# Patient Record
Sex: MALE | Race: WHITE
[De-identification: names, ages, dates, MRNs, and addresses within clinical notes are randomized per-mention and may not be internally consistent; named-entity substitution may affect disease eponyms.]

---

## 2017-07-02 ENCOUNTER — HOSPITAL ENCOUNTER (EMERGENCY)
Dept: HOSPITAL 25 - ED | Age: 57
Discharge: HOME | End: 2017-07-02
Payer: COMMERCIAL

## 2017-07-02 ENCOUNTER — HOSPITAL ENCOUNTER (EMERGENCY)
Dept: HOSPITAL 25 - UCEAST | Age: 57
Discharge: ANOTHER HOSPITAL | End: 2017-07-02
Payer: COMMERCIAL

## 2017-07-02 VITALS — SYSTOLIC BLOOD PRESSURE: 148 MMHG | DIASTOLIC BLOOD PRESSURE: 96 MMHG

## 2017-07-02 VITALS — DIASTOLIC BLOOD PRESSURE: 102 MMHG | SYSTOLIC BLOOD PRESSURE: 174 MMHG

## 2017-07-02 DIAGNOSIS — N20.0: Primary | ICD-10-CM

## 2017-07-02 DIAGNOSIS — I25.119: ICD-10-CM

## 2017-07-02 DIAGNOSIS — I10: ICD-10-CM

## 2017-07-02 DIAGNOSIS — R10.9: Primary | ICD-10-CM

## 2017-07-02 DIAGNOSIS — Z87.891: ICD-10-CM

## 2017-07-02 DIAGNOSIS — J45.909: ICD-10-CM

## 2017-07-02 LAB
ALBUMIN SERPL BCG-MCNC: 4.4 G/DL (ref 3.2–5.2)
ALP SERPL-CCNC: 41 U/L (ref 34–104)
ALT SERPL W P-5'-P-CCNC: 34 U/L (ref 7–52)
ANION GAP SERPL CALC-SCNC: 11 MMOL/L (ref 2–11)
AST SERPL-CCNC: 32 U/L (ref 13–39)
BUN SERPL-MCNC: 27 MG/DL (ref 6–24)
BUN/CREAT SERPL: 19.7 (ref 8–20)
CALCIUM SERPL-MCNC: 9.5 MG/DL (ref 8.6–10.3)
CHLORIDE SERPL-SCNC: 104 MMOL/L (ref 101–111)
GLOBULIN SER CALC-MCNC: 2.7 G/DL (ref 2–4)
GLUCOSE SERPL-MCNC: 172 MG/DL (ref 70–100)
HCO3 SERPL-SCNC: 25 MMOL/L (ref 22–32)
HCT VFR BLD AUTO: 45 % (ref 42–52)
HGB BLD-MCNC: 14.6 G/DL (ref 14–18)
LIPASE SERPL-CCNC: 24 U/L (ref 11–82)
MCH RBC QN AUTO: 34 PG (ref 27–31)
MCHC RBC AUTO-ENTMCNC: 33 G/DL (ref 31–36)
MCV RBC AUTO: 102 FL (ref 80–94)
POTASSIUM SERPL-SCNC: 3.9 MMOL/L (ref 3.5–5)
PROT SERPL-MCNC: 7.1 G/DL (ref 6.4–8.9)
RBC # BLD AUTO: 4.36 10^6/UL (ref 4–5.4)
SODIUM SERPL-SCNC: 140 MMOL/L (ref 133–145)
WBC # BLD AUTO: 9.1 10^3/UL (ref 3.5–10.8)

## 2017-07-02 PROCEDURE — 81003 URINALYSIS AUTO W/O SCOPE: CPT

## 2017-07-02 PROCEDURE — 74176 CT ABD & PELVIS W/O CONTRAST: CPT

## 2017-07-02 PROCEDURE — 96375 TX/PRO/DX INJ NEW DRUG ADDON: CPT

## 2017-07-02 PROCEDURE — 81015 MICROSCOPIC EXAM OF URINE: CPT

## 2017-07-02 PROCEDURE — 99214 OFFICE O/P EST MOD 30 MIN: CPT

## 2017-07-02 PROCEDURE — 83690 ASSAY OF LIPASE: CPT

## 2017-07-02 PROCEDURE — 96374 THER/PROPH/DIAG INJ IV PUSH: CPT

## 2017-07-02 PROCEDURE — 93005 ELECTROCARDIOGRAM TRACING: CPT

## 2017-07-02 PROCEDURE — 86141 C-REACTIVE PROTEIN HS: CPT

## 2017-07-02 PROCEDURE — G0463 HOSPITAL OUTPT CLINIC VISIT: HCPCS

## 2017-07-02 PROCEDURE — 99283 EMERGENCY DEPT VISIT LOW MDM: CPT

## 2017-07-02 PROCEDURE — 36415 COLL VENOUS BLD VENIPUNCTURE: CPT

## 2017-07-02 PROCEDURE — 80053 COMPREHEN METABOLIC PANEL: CPT

## 2017-07-02 PROCEDURE — 96360 HYDRATION IV INFUSION INIT: CPT

## 2017-07-02 PROCEDURE — 85025 COMPLETE CBC W/AUTO DIFF WBC: CPT

## 2017-07-02 RX ADMIN — SODIUM CHLORIDE ONE MLS/HR: 900 IRRIGANT IRRIGATION at 10:47

## 2017-07-02 RX ADMIN — SODIUM CHLORIDE ONE MLS/HR: 900 IRRIGANT IRRIGATION at 11:27

## 2017-07-02 NOTE — RAD
INDICATION: Left flank pain     



COMPARISON: CT August 31, 2015

 

TECHNIQUE: Noncontrast axial source images were acquired from the level hemidiaphragms to

the symphysis pubis as part of CT imaging for renal stone.



Lung bases: The lung bases are clear.



Liver: The liver is enlarged with findings of hepatic steatosis. Noncontrast imaging shows

no evidence of a hepatic mass or ductal dilatation.



Gallbladder: There are no calcified gallstones. There is no evidence of wall thickening or

pericholecystic fluid..



Spleen: The spleen is normal in size. The noncontrast CT appearance is normal. There is a

small splenule



Pancreas: Noncontrast imaging shows no pancreatic mass or ductal dilitation.



Adrenal glands: No masses are identified.



Kidneys/Bladder: There is a proximal, 4 mm, left ureteral calculus producing mild to

moderate obstructive findings. There are no other microcalcifications of urinary

significance. There is mild left-sided perinephric stranding.



Adenopathy: There is no evidence of intraperitoneal or retroperitoneal adenopathy.

Evaluation is limited without oral contrast.



Fluid collections: There are no free or localized fluid collections.



Vessels: The aorta and iliac vessels are normal in caliber. There are no significant

atherosclerotic changes. The IVC appears normal 



Pelvic organs: The prostate and seminal vesicles appear normal



GI tract: Evaluation of the bowel is limited without oral contrast. The stomach and upper

GI tract are unremarkable. The lower GI tract is remarkable for scattered diverticula of

the sigmoid colon. There are no obstructive findings. The appendix is visualized and

appears normal.



Soft tissues: No soft tissue abnormalities of the extraperitoneal abdomen or pelvis are

identified.



Osseous structures: There are no acute osseous findings. There is chronic L5 spondylolysis

with a minimal anterolisthesis. There is degenerative disc disease at L5-S1. There is

spurring about the thoracolumbar junction.



IMPRESSION:  4 MM PROXIMAL LEFT URETERAL CALCULUS WITH MILD/MODERATE OBSTRUCTIVE FINDINGS.

## 2017-07-02 NOTE — UC
Abdominal Pain Male HPI





- HPI Summary


HPI Summary: 





56 YEAR OLD MALE PRESENTS WITH SEVERE LEFT FLANK PAIN. 





- History of Current Complaint


Chief Complaint: UCGU


Stated Complaint: ABDOMINAL PAIN


Time Seen by Provider: 07/02/17 09:24


Severity Initially: Severe


Severity Currently: Severe


Pain Scale Used: 0-10 Numeric - 9/10





- Allergies/Home Medications


Allergies/Adverse Reactions: 


 Allergies











Allergy/AdvReac Type Severity Reaction Status Date / Time


 


Amlodipine [From Norvasc] AdvReac Unknown Edema Verified 07/02/17 11:12














PMH/Surg Hx/FS Hx/Imm Hx





- Surgical History


Surgical History: Yes


Surgery Procedure, Year, and Place: RIGHT KNEE ARTHROSCOPES X 3; bowel surgery 

endometrial reattachment; T&A; CARDIAC STENTS X 2- PATIENT WILL BRING CARDS TO 

BE SCANNED IN (PER CARDIAC CATH REPORT A PROMUS PREMIER STENT AND A PROMUS 

ELEMENT STENT WERE PLACED)





- Social History


Alcohol Use: Weekly


Alcohol Amount: 2-3 DRINKS/WEEK


Substance Use Type: None


Smoking Status (MU): Former Smoker


Type: Cigarettes


Length of Time of Smoking/Using Tobacco: 15 years


Have You Smoked in the Last Year: No


When Did the Patient Quit Smoking/Using Tobacco: 25 YRS AGO





- Immunization History


Most Recent Influenza Vaccination: 11/01/14


Most Recent Tetanus Shot: thirty years ago


Most Recent Pneumonia Vaccination: no


Vaccination Up to Date: Yes





Review of Systems


Constitutional: Negative


Skin: Negative


Eyes: Negative


ENT: Negative


Respiratory: Negative


Cardiovascular: Negative


Gastrointestinal: Abdominal Pain


Genitourinary: Negative


Motor: Negative


Neurovascular: Negative


Musculoskeletal: Negative


Neurological: Negative


Psychological: Negative


All Other Systems Reviewed And Are Negative: Yes





Physical Exam


Triage Information Reviewed: Yes


Vital Signs: 


 Initial Vital Signs











Temp  35.7 C   07/02/17 09:19


 


Pulse  62   07/02/17 09:19


 


Resp  20   07/02/17 09:19


 


BP  156/88   07/02/17 09:19


 


Pulse Ox  99   07/02/17 09:19











Eye Exam: Normal


ENT Exam: Normal


Dental Exam: Normal


Neck exam: Normal


Neck: Positive: 1


Respiratory Exam: Normal


Cardiovascular Exam: Normal


Abdominal Exam: Normal


Abdomen Description: Positive: CVA Tenderness (L), Guarding


Musculoskeletal Exam: Normal


Neurological Exam: Normal


Psychological Exam: Normal


Skin Exam: Normal





Abd Pain Male Course/Dx





- Differential Dx/Clinical Impression


Provider Diagnoses: LEFT FLANK PAIN





Discharge





- Discharge Plan


Condition: Guarded


Disposition: TRANS HIGHER LVL OF CARE FAC


Patient Education Materials:  Kidney Stones (ED)


Referrals: 


Jimbo Rasheed MD [Primary Care Provider] -

## 2017-07-02 NOTE — ED
GI/ HPI





- HPI Summary


HPI Summary: 


56M w/ PMH of MI presents with left side flank pain for a day.   He admits to 

intense pain.  He has a history of kidney stones. He denies any fever, dysuria, 

hematuria, frequency, urgency.  He denies any diarrhea, constipation.  He has a 

previous bowel obstruction that needed surgery pain years ago.  The pain is 

similar to when has kidney stones in past.  He admits to vomiting and nausea. 








- History of Current Complaint


Chief Complaint: EDFlankPain


Time Seen by Provider: 07/02/17 10:39


Stated Complaint: ABD PAIN





- Allergy/Home Medications


Allergies/Adverse Reactions: 


 Allergies











Allergy/AdvReac Type Severity Reaction Status Date / Time


 


Amlodipine [From Norvasc] AdvReac Unknown Edema Verified 07/02/17 11:12














PMH/Surg Hx/FS Hx/Imm Hx


Endocrine/Hematology History: 


   Denies: Hx Diabetes


Cardiovascular History: Reports: Hx Angina, Hx Coronary Artery Disease, Hx 

Hypercholesterolemia, Hx Hypertension


   Denies: Hx Congestive Heart Failure, Hx Pacemaker/ICD


Respiratory History: Reports: Hx Asthma - poss recent diagnosis, Hx Seasonal 

Allergies - "constant", Hx Sleep Apnea, Other Respiratory Problems/Disorders - 

ex-smoker


GI History: Reports: Hx Obstructive Bowel


   Denies: Hx Ulcer


 History: Reports: Hx Kidney Stones


   Denies: Hx Acute Renal Failure, Hx Renal Disease


Musculoskeletal History: Reports: Hx Arthritis - knees, back, hands, Hx 

Rheumatoid Arthritis, Hx Back Problems - fractures, degenerated disks, bone 

spurs, Hx Tendonitis - elbow


   Denies: Hx Scoliosis


Sensory History: Reports: Hx Contacts or Glasses


   Denies: Hx Cataracts


Opthamlomology History: Reports: Hx Contacts or Glasses


   Denies: Hx Cataracts


Neurological History: Reports: Other Neuro Impairments/Disorders - PAIN CLINIC 

PT


   Denies: Hx Developmental Delay, Hx Headaches, Hx Migraine, Hx Nerve Disease, 

Hx Seizures, Hx Spinal Cord Injury, Hx Transient Ischemic Attacks (TIA)


Psychiatric History: 


   Denies: Hx Panic Disorder





- Surgical History


Surgery Procedure, Year, and Place: RIGHT KNEE ARTHROSCOPES X 3; bowel surgery 

endometrial reattachment; T&A; CARDIAC STENTS X 2- PATIENT WILL BRING CARDS TO 

BE SCANNED IN (PER CARDIAC CATH REPORT A PROMUS PREMIER STENT AND A PROMUS 

ELEMENT STENT WERE PLACED)


Hx Anesthesia Reactions: No


Infectious Disease History: No


Infectious Disease History: 


   Denies: Hx Clostridium Difficile, Hx Hepatitis, Hx Human Immunodeficiency 

Virus (HIV), Hx of Known/Suspected MRSA, Hx Shingles, Hx Tuberculosis, Hx Known/

Suspected VRE, Hx Known/Suspected VRSA, History Other Infectious Disease, 

Traveled Outside the [place] in Last 30 Days





- Family History


Known Family History: Positive: Cardiac Disease





- Social History


Alcohol Use: Weekly


Alcohol Amount: 2-3 DRINKS/WEEK


Substance Use Type: Reports: None


Smoking Status (MU): Former Smoker


Type: Cigarettes


Length of Time of Smoking/Using Tobacco: 15 years


Have You Smoked in the Last Year: No





Review of Systems


Negative: Fever


Negative: Chest Pain


Negative: Shortness Of Breath


Positive: Vomiting, Nausea.  Negative: Abdominal Pain, Diarrhea


Positive: flank pain.  Negative: dysuria, frequency


All Other Systems Reviewed And Are Negative: Yes





Physical Exam


Triage Information Reviewed: Yes


Vital Signs On Initial Exam: 


 Initial Vitals











Temp Pulse Resp BP Pulse Ox


 


 96.7 F   68   12   153/106   99 


 


 07/02/17 10:15  07/02/17 10:15  07/02/17 10:15  07/02/17 10:15  07/02/17 10:15











Vital Signs Reviewed: Yes


Appearance: Positive: Pain Distress


Skin: Positive: Warm, Dry


Head/Face: Positive: Normal Head/Face Inspection


Eyes: Positive: Normal, Conjunctiva Clear


ENT: Positive: Normal ENT inspection, Pharynx normal, TMs normal


Respiratory/Lung Sounds: Positive: Clear to Auscultation, Breath Sounds Present


Cardiovascular: Positive: Normal, RRR


Abdomen Description: Positive: Soft, CVA Tenderness (L), Other: - tenderness on 

left side of abdomen


Bowel Sounds: Positive: Present





- [place] Coma Scale


Coma Scale Total: 15





Diagnostics





- Vital Signs


 Vital Signs











  Temp Pulse Resp BP Pulse Ox


 


 07/02/17 11:02      95


 


 07/02/17 11:01      93


 


 07/02/17 11:00   71  17  147/119  98


 


 07/02/17 10:50      81


 


 07/02/17 10:47    20  


 


 07/02/17 10:33   71  22  143/92  99


 


 07/02/17 10:20   65   153/106  100


 


 07/02/17 10:15  96.7 F  68  12  153/106  99














- Laboratory


Lab Results: 


 Lab Results











  07/02/17 07/02/17 Range/Units





  11:05 11:05 


 


WBC  9.1   (3.5-10.8)  10^3/ul


 


RBC  4.36   (4.0-5.4)  10^6/ul


 


Hgb  14.6   (14.0-18.0)  g/dl


 


Hct  45   (42-52)  %


 


MCV  102 H   (80-94)  fL


 


MCH  34 H   (27-31)  pg


 


MCHC  33   (31-36)  g/dl


 


RDW  14   (10.5-15)  %


 


Plt Count  189   (150-450)  10^3/ul


 


MPV  8   (7.4-10.4)  um3


 


Neut % (Auto)  87.1 H   (38-83)  %


 


Lymph % (Auto)  8.7 L   (25-47)  %


 


Mono % (Auto)  3.7   (1-9)  %


 


Eos % (Auto)  0.1   (0-6)  %


 


Baso % (Auto)  0.4   (0-2)  %


 


Absolute Neuts (auto)  7.9 H   (1.5-7.7)  10^3/ul


 


Absolute Lymphs (auto)  0.8 L   (1.0-4.8)  10^3/ul


 


Absolute Monos (auto)  0.3   (0-0.8)  10^3/ul


 


Absolute Eos (auto)  0   (0-0.6)  10^3/ul


 


Absolute Basos (auto)  0   (0-0.2)  10^3/ul


 


Absolute Nucleated RBC  0.01   10^3/ul


 


Nucleated RBC %  0.1   


 


Sodium   140  (133-145)  mmol/L


 


Potassium   3.9  (3.5-5.0)  mmol/L


 


Chloride   104  (101-111)  mmol/L


 


Carbon Dioxide   25  (22-32)  mmol/L


 


Anion Gap   11  (2-11)  mmol/L


 


BUN   27 H  (6-24)  mg/dL


 


Creatinine   1.37 H  (0.67-1.17)  mg/dL


 


Est GFR ( Amer)   68.9  (>60)  


 


Est GFR (Non-Af Amer)   53.6  (>60)  


 


BUN/Creatinine Ratio   19.7  (8-20)  


 


Glucose   172 H  ()  mg/dL


 


Calcium   9.5  (8.6-10.3)  mg/dL


 


Total Bilirubin   0.70  (0.2-1.0)  mg/dL


 


AST   32  (13-39)  U/L


 


ALT   34  (7-52)  U/L


 


Alkaline Phosphatase   41  ()  U/L


 


C-React Prot High Sens   1.65  mg/L


 


Total Protein   7.1  (6.4-8.9)  g/dL


 


Albumin   4.4  (3.2-5.2)  g/dL


 


Globulin   2.7  (2-4)  g/dL


 


Albumin/Globulin Ratio   1.6  (1-3)  


 


Lipase   24  (11.0-82.0)  U/L











Result Diagrams: 


 07/02/17 11:05





 07/02/17 11:05


Lab Statement: Any lab studies that have been ordered have been reviewed, and 

results considered in the medical decision making process.





- CT


  ** abd


CT Interpretation: Positive (See Comments) - IMPRESSION: 4 MM PROXIMAL LEFT 

URETERAL CALCULUS WITH MILD/MODERATE OBSTRUCTIVE FINDINGS.


CT Interpretation Completed By: Radiologist





Re-Evaluation





- Re-Evaluation


  ** First Eval


Change: Improved


Comment: pain improved with dilaudid and morphine. dilaudid did call O2 stats 

to dec





GIGU Course/Dx





- Course


Course Of Treatment: 56M w/ PMH of MI presents with left side flank pain for a 

day.   He admits to intense pain.  He has a history of kidney stones. He denies 

any fever, dysuria, hematuria, frequency, urgency.  He denies any diarrhea, 

constipation.  He has a previous bowel obstruction that needed surgery pain 

years ago.  The pain is similar to when has kidney stones in past.  He admits 

to vomiting and nausea.  CVA tenderness on left. CT shows 4mm stone. discussed 

dr rodas says send home with pain meds and call office tomorrow. pain 

controlled. u/a normal. patient understands and agrees with plan





- Diagnoses


Differential Diagnoses - Male: Pyelonephritis, Ureteral Calculi, Urinary Tract 

Infection


Provider Diagnoses: 


 Kidney stone on left side








Discharge





- Discharge Plan


Condition: Good


Disposition: HOME


Prescriptions: 


Ondansetron ODT TAB* [Zofran 4 MG Odt TAB*] 4 mg PO Q6H PRN #20 tab.odt


 PRN Reason: Nausea


Tamsulosin CAP* [Flomax CAP*] 0.4 mg PO DAILY #10 cap


oxyCODONE/Acetamin 5/325 MG* [Percocet 5/325 TAB*] 1 tab PO Q4H PRN #24 tab MDD 

6


 PRN Reason: Pain


Patient Education Materials:  Kidney Stones (ED)


Referrals: 


Juan Rodas MD [Medical Doctor] - 


Jimbo Rasheed MD [Primary Care Provider] - 


Additional Instructions: 


Take ibuprofen three times a day and narcotic as needed every 4-6 hours up two 

tablets


Narcotic will make constipated


Take Zofran every 6 hours for nausea


Take Flomax daily starting tomorrow, first dose given in ED until stone expelled

, make sure stand up slowly


Strain urine for stone


Call office tomorrow for urology follow up


Return to ED if unable to manage pain at home, develop fever, severe vomiting 

or any new or worsening symptoms

## 2017-07-04 ENCOUNTER — HOSPITAL ENCOUNTER (EMERGENCY)
Dept: HOSPITAL 25 - ED | Age: 57
LOS: 1 days | Discharge: ANOTHER HOSPITAL | End: 2017-07-05
Payer: COMMERCIAL

## 2017-07-04 DIAGNOSIS — N23: Primary | ICD-10-CM

## 2017-07-04 DIAGNOSIS — I25.2: ICD-10-CM

## 2017-07-04 DIAGNOSIS — Z87.891: ICD-10-CM

## 2017-07-04 DIAGNOSIS — I10: ICD-10-CM

## 2017-07-04 LAB
ALBUMIN SERPL BCG-MCNC: 4.1 G/DL (ref 3.2–5.2)
ALP SERPL-CCNC: 44 U/L (ref 34–104)
ALT SERPL W P-5'-P-CCNC: 23 U/L (ref 7–52)
ANION GAP SERPL CALC-SCNC: 8 MMOL/L (ref 2–11)
AST SERPL-CCNC: (no result) U/L (ref 13–39)
BUN SERPL-MCNC: 31 MG/DL (ref 6–24)
BUN/CREAT SERPL: 16.9 (ref 8–20)
CALCIUM SERPL-MCNC: 9.5 MG/DL (ref 8.6–10.3)
CHLORIDE SERPL-SCNC: 100 MMOL/L (ref 101–111)
GLOBULIN SER CALC-MCNC: 3.1 G/DL (ref 2–4)
GLUCOSE SERPL-MCNC: 153 MG/DL (ref 70–100)
HCO3 SERPL-SCNC: 28 MMOL/L (ref 22–32)
HCT VFR BLD AUTO: 41 % (ref 42–52)
HGB BLD-MCNC: 13.8 G/DL (ref 14–18)
MCH RBC QN AUTO: 34 PG (ref 27–31)
MCHC RBC AUTO-ENTMCNC: 34 G/DL (ref 31–36)
MCV RBC AUTO: 100 FL (ref 80–94)
POTASSIUM SERPL-SCNC: (no result) MMOL/L (ref 3.5–5)
PROT SERPL-MCNC: 7.2 G/DL (ref 6.4–8.9)
RBC # BLD AUTO: 4.09 10^6/UL (ref 4–5.4)
SODIUM SERPL-SCNC: 136 MMOL/L (ref 133–145)
WBC # BLD AUTO: 12.1 10^3/UL (ref 3.5–10.8)

## 2017-07-04 PROCEDURE — 99284 EMERGENCY DEPT VISIT MOD MDM: CPT

## 2017-07-04 PROCEDURE — 81015 MICROSCOPIC EXAM OF URINE: CPT

## 2017-07-04 PROCEDURE — 36415 COLL VENOUS BLD VENIPUNCTURE: CPT

## 2017-07-04 PROCEDURE — 85025 COMPLETE CBC W/AUTO DIFF WBC: CPT

## 2017-07-04 PROCEDURE — 81003 URINALYSIS AUTO W/O SCOPE: CPT

## 2017-07-04 PROCEDURE — 76775 US EXAM ABDO BACK WALL LIM: CPT

## 2017-07-04 PROCEDURE — 80053 COMPREHEN METABOLIC PANEL: CPT

## 2017-07-04 NOTE — ED
I, Oh,Brenda, scribed for Nir Patino MD on 07/04/17 at 2043 .





GI/ HPI





- HPI Summary


HPI Summary: 


This 55 y/o male presents to ED for persistent left flank pain that occurred 

since 2 days ago and is worse since 1500 PM today. He previously visited ED 2 

days ago and dx with kidney stone. Pt admits that he did not take pain med that 

was rx because of positive constipation. He did take IBP at 1500 PM today. PMHx 

includes recent dx of kidney stone, HTN, bowel obstruction x2, MI s/p cardiac 

stent. 





Plan of care involving pain control and possible transfer to higher care 

facility with urologist's service is discussed with patient. 





- History of Current Complaint


Chief Complaint: EDFlankPain


Time Seen by Provider: 07/04/17 20:25


Stated Complaint: FLANK PAIN


Hx Obtained From: Patient, Medical Records


Onset/Duration: Started Hours Ago, Atraumatic, Still Present


Timing: Constant


Pain Intensity: 9


Location of Pain: Flank


Associated Signs and Symptoms: Positive: Constipation, Flank Pain - left


Aggravating Factor(s): Nothing


Alleviating Factor(s): Nothing





- Allergy/Home Medications


Allergies/Adverse Reactions: 


 Allergies











Allergy/AdvReac Type Severity Reaction Status Date / Time


 


Amlodipine [From Norvasc] AdvReac Unknown Edema Verified 07/02/17 11:12














PMH/Surg Hx/FS Hx/Imm Hx


Endocrine/Hematology History: 


   Denies: Hx Diabetes


Cardiovascular History: Reports: Hx Angina, Hx Coronary Artery Disease, Hx 

Hypercholesterolemia, Hx Hypertension


   Denies: Hx Congestive Heart Failure, Hx Pacemaker/ICD


Respiratory History: Reports: Hx Asthma - poss recent diagnosis, Hx Seasonal 

Allergies - "constant", Hx Sleep Apnea, Other Respiratory Problems/Disorders - 

ex-smoker


GI History: Reports: Hx Obstructive Bowel


   Denies: Hx Ulcer


 History: Reports: Hx Kidney Stones


   Denies: Hx Acute Renal Failure, Hx Renal Disease


Musculoskeletal History: Reports: Hx Arthritis - knees, back, hands, Hx 

Rheumatoid Arthritis, Hx Back Problems - fractures, degenerated disks, bone 

spurs, Hx Tendonitis - elbow


   Denies: Hx Scoliosis


Sensory History: Reports: Hx Contacts or Glasses


   Denies: Hx Cataracts


Opthamlomology History: Reports: Hx Contacts or Glasses


   Denies: Hx Cataracts


Neurological History: Reports: Other Neuro Impairments/Disorders - PAIN CLINIC 

PT


   Denies: Hx Developmental Delay, Hx Headaches, Hx Migraine, Hx Nerve Disease, 

Hx Seizures, Hx Spinal Cord Injury, Hx Transient Ischemic Attacks (TIA)


Psychiatric History: 


   Denies: Hx Panic Disorder





- Surgical History


Surgery Procedure, Year, and Place: RIGHT KNEE ARTHROSCOPES X 3; bowel surgery 

endometrial reattachment; T&A; CARDIAC STENTS X 2- PATIENT WILL BRING CARDS TO 

BE SCANNED IN (PER CARDIAC CATH REPORT A PROMUS PREMIER STENT AND A PROMUS 

ELEMENT STENT WERE PLACED)


Hx Anesthesia Reactions: No


Infectious Disease History: No


Infectious Disease History: 


   Denies: Hx Clostridium Difficile, Hx Hepatitis, Hx Human Immunodeficiency 

Virus (HIV), Hx of Known/Suspected MRSA, Hx Shingles, Hx Tuberculosis, Hx Known/

Suspected VRE, Hx Known/Suspected VRSA, History Other Infectious Disease, 

Traveled Outside the [place] in Last 30 Days





- Family History


Known Family History: Positive: Cardiac Disease





- Social History


Alcohol Use: Weekly


Alcohol Amount: 2-3 DRINKS/WEEK


Hx Substance Use: No


Substance Use Type: Reports: None


Hx Tobacco Use: Yes


Smoking Status (MU): Former Smoker


Type: Cigarettes


Length of Time of Smoking/Using Tobacco: 15 years


Have You Smoked in the Last Year: No





Review of Systems


Negative: Fever


Positive: Other - Positive constipation


Positive: flank pain - left


All Other Systems Reviewed And Are Negative: Yes





Physical Exam


Triage Information Reviewed: Yes


Vital Signs On Initial Exam: 


 Initial Vitals











Temp Pulse Resp BP Pulse Ox


 


 98.3 F   89   18   146/93   94 


 


 07/04/17 20:16  07/04/17 20:16  07/04/17 20:16  07/04/17 20:16  07/04/17 20:16











Vital Signs Reviewed: Yes


Appearance: Positive: Well-Appearing, Pain Distress - moderate discomfort


Skin: Positive: Warm


Eyes: Positive: NATHANIEL


ENT: Positive: Hearing grossly normal


Neck: Positive: Supple


Respiratory/Lung Sounds: Positive: Clear to Auscultation, Breath Sounds Present


Cardiovascular: Positive: RRR


Abdomen Description: Positive: Nontender, Soft


Bowel Sounds: Positive: Present


Musculoskeletal: Positive: Strength/ROM Intact


Neurological: Positive: Alert, Oriented to Person Place, Time


Psychiatric: Positive: Affect/Mood Appropriate





Diagnostics





- Vital Signs


 Vital Signs











  Temp Pulse Resp BP Pulse Ox


 


 07/04/17 20:16  98.3 F  89  18  146/93  94














- Laboratory


Lab Results: 


 Lab Results











  07/04/17 07/04/17 07/04/17 Range/Units





  20:55 20:55 21:04 


 


WBC  12.1 H    (3.5-10.8)  10^3/ul


 


RBC  4.09    (4.0-5.4)  10^6/ul


 


Hgb  13.8 L    (14.0-18.0)  g/dl


 


Hct  41 L    (42-52)  %


 


MCV  100 H    (80-94)  fL


 


MCH  34 H    (27-31)  pg


 


MCHC  34    (31-36)  g/dl


 


RDW  14    (10.5-15)  %


 


Plt Count  204    (150-450)  10^3/ul


 


MPV  9    (7.4-10.4)  um3


 


Neut % (Auto)  85.6 H    (38-83)  %


 


Lymph % (Auto)  6.3 L    (25-47)  %


 


Mono % (Auto)  7.8    (1-9)  %


 


Eos % (Auto)  0.1    (0-6)  %


 


Baso % (Auto)  0.2    (0-2)  %


 


Absolute Neuts (auto)  10.3 H    (1.5-7.7)  10^3/ul


 


Absolute Lymphs (auto)  0.8 L    (1.0-4.8)  10^3/ul


 


Absolute Monos (auto)  0.9 H    (0-0.8)  10^3/ul


 


Absolute Eos (auto)  0    (0-0.6)  10^3/ul


 


Absolute Basos (auto)  0    (0-0.2)  10^3/ul


 


Absolute Nucleated RBC  0    10^3/ul


 


Nucleated RBC %  0    


 


Sodium   136   (133-145)  mmol/L


 


Potassium   TNP   


 


Chloride   100 L   (101-111)  mmol/L


 


Carbon Dioxide   28   (22-32)  mmol/L


 


Anion Gap   8   (2-11)  mmol/L


 


BUN   31 H   (6-24)  mg/dL


 


Creatinine   1.83 H   (0.67-1.17)  mg/dL


 


Est GFR ( Amer)   49.3   (>60)  


 


Est GFR (Non-Af Amer)   38.3   (>60)  


 


BUN/Creatinine Ratio   16.9   (8-20)  


 


Glucose   153 H   ()  mg/dL


 


Calcium   9.5   (8.6-10.3)  mg/dL


 


Total Bilirubin   0.70   (0.2-1.0)  mg/dL


 


AST   TNP   


 


ALT   23   (7-52)  U/L


 


Alkaline Phosphatase   44   ()  U/L


 


Total Protein   7.2   (6.4-8.9)  g/dL


 


Albumin   4.1   (3.2-5.2)  g/dL


 


Globulin   3.1   (2-4)  g/dL


 


Albumin/Globulin Ratio   1.3   (1-3)  


 


Urine Color    Yellow  


 


Urine Appearance    Clear  


 


Urine pH    6.0  (5-9)  


 


Ur Specific Gravity    1.017  (1.010-1.030)  


 


Urine Protein    Negative  (Negative)  


 


Urine Ketones    Trace H  (Negative)  


 


Urine Blood    1+ H  (Negative)  


 


Urine Nitrate    Negative  (Negative)  


 


Urine Bilirubin    Negative  (Negative)  


 


Urine Urobilinogen    Negative  (Negative)  


 


Ur Leukocyte Esterase    Negative  (Negative)  


 


Urine WBC (Auto)    Absent  (Absent)  


 


Urine RBC (Auto)    1+(3-5/hpf) H  (Absent)  


 


Urine Bacteria    Absent  (Absent)  


 


Urine Glucose    1+(50 mg/dl) H  (Negative)  


 


Urine Ascorbic Acid    * H  (Negative)  











Result Diagrams: 


 07/04/17 20:55





 07/04/17 21:39


Lab Statement: Any lab studies that have been ordered have been reviewed, and 

results considered in the medical decision making process.





- Additional Comments


Diagnostic Additional Comments: 





US Renal -- Left hydronephrosis. An absent ureteral jet suggested left ureteral 

obstruction. 





Re-Evaluation





- Re-Evaluation


  ** First Eval


Re-Evaluation Time: 23:45


Comment: MD in room to update pt on US renal imaging results. Plan of care 

involving transfer for urology workup is discussed with pt, and he is agreeable 

at this moment.





GIGU Course/Dx





- Course


Course Of Treatment: Consult:  0012 Dr. Roland ([facility] [place]).  0034 Dr. Cardoso, accepting doctor at [place], [place]


Assessment/Plan: This 55 y/o male presents to ED for persistent left flank pain 

since his last ED visit for kidney stone. He admits that he did not take pain 

med that has been prescribed to him because of constipation. Pt is given IV NS 

and IV toradol for pain control. UA indicates trace ketone,  1+ blood and RBC. 

Blood work is wnl except for elevated WBC of 12.1, Creatinine of 1.83, and BUN 

of 31. US renal indicates left ureteral obstruction.  Since urology is not 

present on-call today, plan of care involving transfer to [facility] in [place] 

is discussed with pt, and he is agreeable at this moment. Dr. Cardoso is 

accepting doctor at [facility] in [place].





- Diagnoses


Provider Diagnoses: 


 Renal colic








- Physician Notifications


Discussed Care Of Patient With: Jarred Mas - [facility] at [facility]


Time Discussed With Above Provider: 00:12


Instructed by Provider To: Transfer - [facility] [place]


Reason For Transfer: Specialty available at [facility] [place] but not on call. - Urologist is 

not on call.





Discharge





- Discharge Plan


Condition: Fair


Disposition: TRANS HIGHER LVL OF CARE FAC


Referrals: 


Jimbo Rasheed MD [Primary Care Provider] - 





The documentation as recorded by the Joni palma Soohyun accurately reflects the 

service I personally performed and the decisions made by me, Nir Patino MD.

## 2017-07-05 VITALS — DIASTOLIC BLOOD PRESSURE: 87 MMHG | SYSTOLIC BLOOD PRESSURE: 139 MMHG

## 2017-07-05 NOTE — RAD
HISTORY: Left flank pain



COMPARISONS: CT dated July 02, 2017



TECHNIQUE: Multiple transverse and longitudinal ultrasound images were obtained of the

kidneys and bladder using grayscale and color Doppler imaging.



FINDINGS:





RIGHT KIDNEY: The right kidney is normal in shape, size, contour, and echogenicity.  There

is no hydronephrosis or nephrolithiasis. 

The right kidney measures 14.5 x 5.7 x 6.2 cm. 



LEFT KIDNEY: The left kidney is normal in shape, size, contour, and echogenicity.  There

is moderate pelvocaliectasis, similar to the CT of July 02, 2017 accounting for

differences in technique .

The left kidney measures 14.5 x 7.2 x 7 cm.



BLADDER: The left ureteral jet is not visualized.    



AORTA AND IVC: No images are submitted of the vasculature.     

RETROPERITONEUM: Unremarkable.



OTHER: None.



IMPRESSION: 

MODERATE LEFT-SIDED HYDRONEPHROSIS. A LEFT URETERAL JET IS NOT VISUALIZED.

## 2018-10-17 NOTE — HP
CC:  Dr. Rasheed; Dr. Greene *

 

ADMITTING HISTORY AND PHYSICAL:

 

DATE OF ADMISSION:  10/22/18

 

ADMITTING DIAGNOSES:

1.  Gross hematuria.

2.  Left flank pain.

3.  Left renal calculi.

 

PLANNED PROCEDURE:

1.  Left stent insertion.

2.  Shock-wave lithotripsy of left renal calculi.

 

SURGEON:  Dr. Haile.

 

HISTORY OF PRESENT ILLNESS:  Kaz Girard is a 58-year-old gentleman who has 
had episodic gross hematuria and also episodes of left flank pain off and on 
for the last 5 to 6 weeks.  He had been on Coumadin when I had initially 
evaluated him and even off the Coumadin, he has had episodes of gross 
hematuria.  Renal sonogram initially had revealed multiple left renal calculi 
and also a small calculus in the left distal ureter, which subsequently has 
passed.  He is now being brought in for management of the left renal calculi.

 

PAST MEDICAL HISTORY:  Significant for:

1.  Paroxysmal atrial fibrillation.

2.  History of aortic valve disorder.

3.  Acquired spondylolisthesis.

4.  Obstructive sleep apnea.

5.  Hypertension.

6.  Impaired fasting glycemia.

 

PAST SURGICAL HISTORY:  Significant for:

1.  Aortic valve replacement in May 2018 (bovine bioprosthetic valve).

2.  Laparotomy for bowel disorder in 2004.

 

MEDICATIONS ON ADMISSION:  Include:

1.  Carvedilol 25 mg twice a day.

2.  Desloratadine 5 mg daily.

3.  Potassium chloride 10 mEq every day.

4.  Aspirin 81 mg a day.

5.  Celebrex 200 mg daily.

6.  Coumadin, which has recently been stopped after his most recent visit to 
his cardiologist.

7.  Amiodarone 200 mg daily.

8.  Losartan potassium/hydrochlorothiazide 160/25 one tablet daily.

 

ALLERGIES AND INTOLERANCES:  AMLODIPINE and COLCHICINE.

 

SMOKING HISTORY:  He is a former smoker who quit in 1991.

 

                               PHYSICAL EXAMINATION

 

GENERAL:  Reveals a pleasant, middle-aged gentleman.

 

VITAL SIGNS:  Blood pressure is 142/86, pulse 63 per minute, oxygen saturation 
97% on room air.

 

LUNGS:  Clear bilaterally.

 

CARDIOVASCULAR EXAM:  S1, S2.

 

ABDOMEN:  Soft with mild left flank tenderness.

 

 IMPRESSION:  A 58-year-old gentleman with episodic gross hematuria and 
episodic left flank pain.

 

PLAN:  Planned procedure is left stent insertion and shock-wave lithotripsy of 
left renal calculi.  I have discussed the procedure in detail including 
possible risks of bleeding, infection, incomplete fragmentation, possible 
injury to the kidney. All his questions have been answered.  Plan is left stent 
insertion and lithotripsy.

 

[id number] #: [id number]

JENNIFER

## 2018-10-22 ENCOUNTER — HOSPITAL ENCOUNTER (OUTPATIENT)
Dept: HOSPITAL 25 - OR | Age: 58
Discharge: HOME | End: 2018-10-22
Attending: UROLOGY
Payer: COMMERCIAL

## 2018-10-22 VITALS — DIASTOLIC BLOOD PRESSURE: 86 MMHG | SYSTOLIC BLOOD PRESSURE: 147 MMHG

## 2018-10-22 DIAGNOSIS — I48.0: ICD-10-CM

## 2018-10-22 DIAGNOSIS — R31.0: ICD-10-CM

## 2018-10-22 DIAGNOSIS — N20.0: Primary | ICD-10-CM

## 2018-10-22 DIAGNOSIS — Z87.891: ICD-10-CM

## 2018-10-22 DIAGNOSIS — G47.33: ICD-10-CM

## 2018-10-22 DIAGNOSIS — I10: ICD-10-CM

## 2018-10-22 DIAGNOSIS — R73.01: ICD-10-CM

## 2018-10-22 DIAGNOSIS — I35.8: ICD-10-CM

## 2018-10-22 DIAGNOSIS — Z79.01: ICD-10-CM

## 2018-10-22 LAB — INR PPP/BLD: 1.02 (ref 0.77–1.02)

## 2018-10-22 PROCEDURE — C1876 STENT, NON-COA/NON-COV W/DEL: HCPCS

## 2018-10-22 PROCEDURE — 74018 RADEX ABDOMEN 1 VIEW: CPT

## 2018-10-22 PROCEDURE — 36415 COLL VENOUS BLD VENIPUNCTURE: CPT

## 2018-10-22 PROCEDURE — 85610 PROTHROMBIN TIME: CPT

## 2018-10-22 NOTE — RAD
HISTORY: post op



COMPARISONS: October 22, 2013 at 8:30 AM 



VIEWS: Frontal views of the abdomen.



FINDINGS: 

BOWEL: There is a nonspecific bowel gas pattern, with nondilated small bowel gas noted.

CALCULI: The left calculus noted on the previous examination is not clearly visualized,

though evaluation is limited by lung bowel. There has been interval placement of left

ureteral stent. 

BONES AND SOFT TISSUES: Mild degenerative changes are noted. 

OTHER FINDINGS: The lung bases are clear. There is no subphrenic gas.



IMPRESSION: 

LEFT URETERAL STENT

## 2018-10-22 NOTE — RAD
Indication: Lithotripsy.



Flat and upright views of the abdomen demonstrates calcification overlying the lower pole

of the left kidney. Psoas margins are intact. No organomegaly is noted.



IMPRESSION: Calcification overlying the lower pole of the left kidney. This was not

identified on October 15, 2018.

## 2018-10-23 NOTE — OP
DATE OF OPERATION:  10/22/18 - [facility]

 

DATE OF BIRTH:  [birth year]

 

SURGEON:  Guzman Haile MD

 

ANESTHESIOLOGIST:  Cholo Henriquez DO

 

ANESTHESIA:  General.

 

PRE-OP DIAGNOSES:

1.  Left renal calculus.

2.  Gross hematuria.

 

POST-OP DIAGNOSES:

1.  Left renal calculus.

2.  Gross hematuria.

 

OPERATIVE PROCEDURE:

1.  Cystoscopy, left retrograde pyelogram, and left stent insertion.

2.  Shockwave lithotripsy of left renal calculus.

 

INDICATIONS:  Kaz Girard is a 58-year-old gentleman, who has had episodic 
gross hematuria secondary to left renal calculi.  He is now being brought in 
for treatment of the same.  I have discussed the procedure of lithotripsy in 
detail including possible risks of bleeding, infection, incomplete fragmentation
, and possible injury to the kidney.  He appears to understand and wishes to 
proceed as planned.

 

COMPLICATIONS:  None.

 

POSTOPERATIVE CONDITION:  Stable.

 

STENT USED:  8-Monegasque stent, left ureter.

 

DESCRIPTION OF PROCEDURE:  After induction of general anesthesia, the patient 
was placed in dorsal lithotomy position.  Sequential compression devices were 
in place and functioning. Initial cystoscopy revealed a normal appearing urethra
, a mildly enlarged prostate, and a normal appearing bladder.  There was a 
small benign appearing protuberance noted in the mid trigone, but no evidence 
of any suspicious bladder lesions noted.

 

Left retrograde pyelogram revealed a filling defect in the left renal pelvis 
consistent with the calculus.  An 8-Monegasque stent was introduced and positioned 
under fluoroscopy with good proximal and distal positioning obtained.

 

Next, the patient was placed on the lithotripsy table in supine position.  The 
calculus which was within the loop of the stent was localized using fluoroscopy 
and shockwave lithotripsy was commenced at a rate of 60 shocks per minute.  
After the initial 300 shocks, there was pause in lithotripsy for several 
minutes in an effort to minimize any potential trauma to the kidney.  
Lithotripsy was then resumed and periodic imaging revealed good localization 
and fragmentation.  A total of 1600 shocks were administered.  The patient 
tolerated the procedure satisfactorily and was transferred back to the recovery 
area in stable condition.

 

 [id number] #: [id number]

MTDD

## 2018-12-11 ENCOUNTER — HOSPITAL ENCOUNTER (EMERGENCY)
Dept: HOSPITAL 25 - ED | Age: 58
Discharge: HOME | End: 2018-12-11
Payer: COMMERCIAL

## 2018-12-11 VITALS — SYSTOLIC BLOOD PRESSURE: 115 MMHG | DIASTOLIC BLOOD PRESSURE: 67 MMHG

## 2018-12-11 DIAGNOSIS — Z88.8: ICD-10-CM

## 2018-12-11 DIAGNOSIS — Z95.5: ICD-10-CM

## 2018-12-11 DIAGNOSIS — I25.119: ICD-10-CM

## 2018-12-11 DIAGNOSIS — R51: ICD-10-CM

## 2018-12-11 DIAGNOSIS — I10: Primary | ICD-10-CM

## 2018-12-11 DIAGNOSIS — Z87.891: ICD-10-CM

## 2018-12-11 DIAGNOSIS — Z82.49: ICD-10-CM

## 2018-12-11 LAB
BASOPHILS # BLD AUTO: 0.1 10^3/UL (ref 0–0.2)
EOSINOPHIL # BLD AUTO: 0.1 10^3/UL (ref 0–0.6)
HCT VFR BLD AUTO: 44 % (ref 42–52)
HGB BLD-MCNC: 14.8 G/DL (ref 14–18)
INR PPP/BLD: 3.97 (ref 0.77–1.02)
LYMPHOCYTES # BLD AUTO: 1.3 10^3/UL (ref 1–4.8)
MCH RBC QN AUTO: 34 PG (ref 27–31)
MCHC RBC AUTO-ENTMCNC: 34 G/DL (ref 31–36)
MCV RBC AUTO: 101 FL (ref 80–94)
MONOCYTES # BLD AUTO: 0.4 10^3/UL (ref 0–0.8)
NEUTROPHILS # BLD AUTO: 2 10^3/UL (ref 1.5–7.7)
NRBC # BLD AUTO: 0 10^3/UL
NRBC BLD QL AUTO: 0
PLATELET # BLD AUTO: 190 10^3/UL (ref 150–450)
RBC # BLD AUTO: 4.36 10^6/UL (ref 4–5.4)
WBC # BLD AUTO: 4 10^3/UL (ref 3.5–10.8)

## 2018-12-11 PROCEDURE — 36415 COLL VENOUS BLD VENIPUNCTURE: CPT

## 2018-12-11 PROCEDURE — 85730 THROMBOPLASTIN TIME PARTIAL: CPT

## 2018-12-11 PROCEDURE — 85025 COMPLETE CBC W/AUTO DIFF WBC: CPT

## 2018-12-11 PROCEDURE — 96375 TX/PRO/DX INJ NEW DRUG ADDON: CPT

## 2018-12-11 PROCEDURE — 85610 PROTHROMBIN TIME: CPT

## 2018-12-11 PROCEDURE — 70450 CT HEAD/BRAIN W/O DYE: CPT

## 2018-12-11 PROCEDURE — 96374 THER/PROPH/DIAG INJ IV PUSH: CPT

## 2018-12-11 PROCEDURE — 80053 COMPREHEN METABOLIC PANEL: CPT

## 2018-12-11 PROCEDURE — 99283 EMERGENCY DEPT VISIT LOW MDM: CPT

## 2018-12-11 NOTE — ED
Hypertension





- HPI Summary


HPI Summary: 





Pt is a 57 y/o M presenting to the ED with a chief complaint of a headache and 

high blood pressure. He has had headaches since this afternoon, he monitors his 

blood pressure at home due to taking two different medicines for it, and feels 

woozy when he stands. The pt denies pain, vomiting, nausea, and photophobia. 

The pt reports he had an MI in the past that he did not have pain with, and had 

blocked kidneys a couple of weeks ago that he did not have pain with but made 

his blood pressure go up. 





- History of Current Complaint


Chief Complaint: EDHypertension


Stated Complaint: HIGH BLOOD PRESSURE


Time Seen by Provider: 12/11/18 19:54


Hx Obtained From: Patient


Onset/Duration: Started Hours Ago


Timing: Constant


Aggravating Factor(s): Nothing


Alleviating Factor(s): Nothing


Associated Signs & Symptoms: Negative - pain, vision changes, Headaches





- Allergies/Home Medications


Allergies/Adverse Reactions: 


 Allergies











Allergy/AdvReac Type Severity Reaction Status Date / Time


 


amlodipine [From Norvasc] Allergy  Edema Verified 11/09/18 10:59


 


colchicine Allergy  Diarrhea Verified 11/09/18 10:59














PMH/Surg Hx/FS Hx/Imm Hx


Previously Healthy: No


Endocrine/Hematology History: 


   Denies: Hx Diabetes


Cardiovascular History: Reports: Hx Angina, Hx Atrial Fibrillation, Hx Coronary 

Artery Disease, Hx Hypercholesterolemia, Hx Hypertension, Hx Valvular Heart 

Disease - AORTIC VALVE REPLACED, Other Cardiovascular Problems/Disorders - SEES 

DR. COLÓN


   Denies: Hx Congestive Heart Failure, Hx Pacemaker/ICD


Respiratory History: Reports: Hx Asthma - poss recent diagnosis, Hx Seasonal 

Allergies - "constant", Hx Sleep Apnea, Other Respiratory Problems/Disorders - 

ex-smoker


GI History: Reports: Hx Obstructive Bowel, Other GI Disorders - "BLOCKED 

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


   Denies: Hx Ulcer


 History: Reports: Hx Kidney Stones


   Denies: Hx Acute Renal Failure, Hx Renal Disease


Musculoskeletal History: Reports: Hx Arthritis - knees, back, hands, Hx 

Rheumatoid Arthritis, Hx Back Problems - fractures, degenerated disks, bone 

spurs, Hx Tendonitis - elbow, Other Musculoskeletal History - DDD


   Denies: Hx Scoliosis


Sensory History: Reports: Hx Contacts or Glasses


   Denies: Hx Cataracts, Hx Hearing Aid


Opthamlomology History: Reports: Hx Contacts or Glasses


   Denies: Hx Cataracts


Neurological History: Reports: Other Neuro Impairments/Disorders - PAIN CLINIC 

PT


   Denies: Hx Developmental Delay, Hx Headaches, Hx Migraine, Hx Nerve Disease, 

Hx Seizures, Hx Spinal Cord Injury, Hx Transient Ischemic Attacks (TIA)


Psychiatric History: 


   Denies: Hx Panic Disorder





- Surgical History


Surgery Procedure, Year, and Place: RIGHT KNEE ARTHROSCOPES X 3; bowel surgery 

endometrial reattachment; T&A; CARDIAC STENTS X 2- PATIENT WILL BRING CARDS TO 

BE SCANNED IN (PER CARDIAC CATH REPORT A PROMUS PREMIER STENT AND A PROMUS 

ELEMENT STENT WERE PLACED)


Hx Anesthesia Reactions: No


Infectious Disease History: No


Infectious Disease History: 


   Denies: Hx Clostridium Difficile, Hx Hepatitis, Hx Human Immunodeficiency 

Virus (HIV), Hx of Known/Suspected MRSA, Hx Shingles, Hx Tuberculosis, Hx Known/

Suspected VRE, Hx Known/Suspected VRSA, History Other Infectious Disease, 

Traveled Outside the [place] in Last 30 Days





- Family History


Known Family History: Positive: Cardiac Disease





- Social History


Alcohol Use: Weekly


Alcohol Amount: 2-3 DRINKS


Hx Substance Use: No


Substance Use Type: Reports: None


Hx Tobacco Use: Yes


Smoking Status (MU): Never Smoked Tobacco


Type: Cigarettes


Amount Used/How Often: 2 PPD X 13 YEARS


Length of Time of Smoking/Using Tobacco: 15 years


Have You Smoked in the Last Year: No





Review of Systems


Negative: Fever


Negative: Photophobia


Negative: Vomiting, Nausea


Musculoskeletal: Negative - pain


All Other Systems Reviewed And Are Negative: Yes





Physical Exam





- Summary


Physical Exam Summary: 


VITAL SIGNS: Reviewed.


GENERAL:  Patient is a well-developed and nourished male who is lying 

comfortable in the stretcher. Patient is not in any acute respiratory distress.


HEAD AND FACE: No signs of trauma. No ecchymosis, hematomas or skull 

depressions. No sinus tenderness.


EYES: PERRLA, EOMI x 2, No injected conjunctiva, no nystagmus.


EARS: Hearing grossly intact. Ear canals and tympanic membranes are within 

normal limits.


MOUTH: Oropharynx within normal limits.


NECK: Supple, trachea is midline, no adenopathy, no JVD, no carotid bruit, no c-

spine tenderness, neck with full ROM.


CHEST: Symmetric, no tenderness at palpation


LUNGS: Clear to auscultation bilaterally. No wheezing or crackles.


CVS: Regular rate and rhythm, S1 and S2 present, no murmurs or gallops 

appreciated.


ABDOMEN: Soft, non-tender. No signs of distention. No rebound no guarding, and 

no masses palpated. Bowel sounds are normal.


EXTREMITIES: FROM in all major joints, no edema, no cyanosis or clubbing.


NEURO: Alert and oriented x 3. No acute neurological deficits. Speech is normal 

and follows commands.


SKIN: Dry and warm


GCS: 15








Triage Information Reviewed: Yes


Vital Signs On Initial Exam: 


 Initial Vitals











Temp Pulse Resp BP Pulse Ox


 


 97.9 F   71   20   185/99   93 


 


 12/11/18 19:15  12/11/18 19:15  12/11/18 19:15  12/11/18 19:15  12/11/18 19:15











Vital Signs Reviewed: Yes





Diagnostics





- Vital Signs


 Vital Signs











  Temp Pulse Resp BP Pulse Ox


 


 12/11/18 19:15  97.9 F  71  20  185/99  93














- Laboratory


Result Diagrams: 


 12/11/18 20:20





 12/11/18 20:20


Lab Statement: Any lab studies that have been ordered have been reviewed, and 

results considered in the medical decision making process.





- CT


  ** Head CT


CT Interpretation Completed By: Radiologist


Summary of CT Findings: No acute intracranial abnormality.  ED physician has 

reviewed this report.





Re-Evaluation





- Re-Evaluation


  ** 1


Re-Evaluation Time: 21:55


Change: Improved


Comment: The pt's headache feels better and his blood pressure has gone down. 

He will be discharged with instructions to follow up with his PCP within 24 

hours and to return to the ED with any new or worsening symptoms. He will also 

be instructed to increase his colace from 25mg to 50mg.





Hypertension Course/Dx





- Course


Course Of Treatment: Pt is a 57 y/o M presenting to the ED with a chief 

complaint of HTN. He reports headaches, feeling woozy, and monitors his BP at 

home due to taking 2 different medicines for it. He has a hx of MI, blocked 

kidneys, and heart surgery.





- Diagnoses


Provider Diagnoses: 


 HTN (hypertension), Headache








Discharge





- Sign-Out/Discharge


Documenting (check all that apply): Patient Departure





- Discharge Plan


Condition: Stable


Disposition: HOME


Referrals: 


Jimbo Rasheed MD [Primary Care Provider] - 


Additional Instructions: 


PLEASE RETURN TO THE EMERGENCY DEPARTMENT WITH ANY NEW OR WORSENING SYMPTOMS.





FOLLOW UP WITH YOUR PRIMARY CARE PROVIDER WITHIN THE NEXT 24 HOURS.





INCREASE YOUR COLACE MEDICATION FROM 25MG TO 50MG BEGINNING IN THE MORNING.





- Billing Disposition and Condition


Condition: STABLE


Disposition: Home





- Attestation Statements


Document Initiated by Lucrecia: Yes


Documenting Scribe: Marisa Krause


Provider For Whom Lucrecia is Documenting (Include Credential): Jessica Allan MD.


Scribe Attestation: 


Marisa HERCULES, scribed for Jessica Allan MD. on 12/12/18 at 0558. 


Scribe Documentation Reviewed: Yes


Provider Attestation: 


The documentation as recorded by the wendiibe, Marisa Krause accurately 

reflects the service I personally performed and the decisions made by me, Jessica Allan MD.


Status of Scribe Document: Viewed

## 2018-12-11 NOTE — XMS REPORT
Cristi Fried

 Created on:2018



Patient:Cristi Fried

Sex:Male

:1960

External Reference #:[id number]





Demographics







 Address  [place] [address]

 

 Home Phone  [phone]

 

 Mobile Phone  [phone]

 

 Work Phone  [phone]

 

 Email Address  [email or web address]

 

 Preferred Language  English

 

 Marital Status  Not  Or 

 

 Jainism Affiliation  Unknown

 

 Race  White

 

 Ethnic Group  Not  Or 









Author







 Organization  [facility]

 

 Address  [address]



   [place] [address]

 

 Phone  [phone]









Support







 Name  Relationship  Address  Phone

 

 Tomeka Fired  Unavailable  Unavailable  [phone]









Care Team Providers







 Name  Role  Phone

 

 Jimbo Rasheed MD  Primary Care Physician  Unavailable









Payers







 Type  Date  Identification Numbers  Payment Provider  Subscriber

 

 Commercial  Effective:  Policy Number:  DONG Rajeev Fried



   2016  [id number]    









 PayID: [id number]  [address] [address]









 [place] [address]









 Medigap Part B  Effective: 2010  Policy Number:  BS Deyanira LILLY Fried



     [id number]    









 Expires: 2011  PayID: [id number]  [address] [address]









 [place] [address]







Problems







 Date  Description  Provider  Status

 

 Onset: 2011  Obesity  Emily Coleman M.D.  Active

 

 Onset: 2011  Benign essential hypertension  Emily Coleman M.D.  Active

 

 Onset: 2011  Disorder of lumbar disc  Emily Coleman M.D.  Active

 

 Onset: 2011  Allergic rhinitis  Emily Coleman M.D.  Active

 

 Onset: 2012  Mitral valve disorder  Jimbo Rasheed M.D.  Active

 

 Onset: 2012  Mixed hyperlipidemia  Jimbo Rasheed M.D.  Active

 

 Onset: 2014  Coronary arteriosclerosis  Saadia Greene M.D.  Active

 

 Onset: 2014  Aortic valve disorder  Saadia Greene M.D.  Active

 

 Onset: 2015  Old myocardial infarction  Saadia Greene M.D.  Active

 

 Onset: 2015  Impaired fasting glycaemia  Emily Coleman M.D.  Active

 

 Onset: 2015  Impaired fasting glycaemia  Emily Coleman M.D.  Active

 

 Onset: 12/15/2015  Essential hypertension  Saadia Greene M.D.  Active

 

 Onset: 2016  Degeneration of lumbar  Jimbo Rasheed M.D.  Active



   intervertebral disc    

 

 Onset: 2016  Thoracic and lumbosacral neuritis  Jimbo Rasheed M.D.  
Active

 

 Onset: 2016  Displacement of lumbar  Cristi Villaseñor M.D.  Active



   intervertebral disc without    



   myelopathy    

 

 Onset: 2016  Acquired spondylolisthesis  Cristi Villaseñor M.D.  Active

 

 Onset: 2017  Cervical disc disorder  Jimbo Rasheed M.D.  Active

 

 Onset: 2017  Disorder of skin AND/OR  Jimbo Rasheed M.D.  Active



   subcutaneous tissue    

 

 Onset: 2017  Other insomnia  Jimbo Rasheed M.D.  Active

 

 Onset: 2017  Athscl heart disease of native cor  Saadia Greene M.D.  
Active



   art w unsp ang pctrs    

 

 Onset: 2017  Asthma without status asthmaticus  Jimbo Rasheed M.D.  
Active

 

 Onset: 2017  Obstructive sleep apnea syndrome  Jimbo Rasheed M.D.  
Active

 

 Onset: 2017  Dyspnea  Jimbo Rasheed M.D.  Active

 

 Onset: 2017  Polyarthropathy  Jimbo Rasheed M.D.  Active

 

 Onset: 2018  Heart valve replacement  Saadia Greene M.D.  Active

 

 Onset: 2018  Paroxysmal atrial fibrillation  Saadia Greene M.D.  Active

 

 Onset: 2018  Edema  Saadia Greene M.D.  Active

 

 Onset: 2012  Blood chemistry abnormal  Jimbo Rasheed M.D.  Resolved









 Resolved: 10/01/2014









 Onset: 2013  Arthralgia of the lower leg  Jimbo Rasheed M.D.  
Resolved









 Resolved: 10/01/2014







Family History







 Date  Family Member(s)  Problem(s)  Comments

 

   General  Heart Disease  







Social History







 Type  Date  Description  Comments

 

 Marital Status      

 

 Lives With    Wife  

 

 Occupation      not at this time due to



       recovering from surgery

 

 Cigarette Use    Quit 20 Years Ago  

 

 ETOH Use    Occasionally consumes alcohol  

 

 Recreational Drug Use    Never Used Drugs  

 

 Smoking    Patient is a former smoker  Quit [id number]

 

 Daily Caffeine    Consumes on average 36oz of  



     soda per day  

 

 Daily Caffeine    consumes chocolate  



     occasionally  

 

 Exercise Type/Frequency    Exercises regularly  







Allergies, Adverse Reactions, Alerts







 Date  Description  Reaction  Status  Severity  Comments

 

 2017  Amlodipine  LE edema  active    

 

 2018  Colchicine    active    severe diarrhea

 

 2012  NKDA    inactive    







Medications







 Medication  Date  Status  Form  Strength  Qnty  SIG  Indications  Ordering



                 Provider

 

 Spironolactone    Active  Tablets  25mg  90tab  1 by mouth  I10  Saadia



           s  every day    KELLY Greene

 

 Valsartan-Hydroch  10/05  Active  Tablets  160-25mg  90tab  1 tab by    Yu CONDON



 lorothiazide          s  mouth    Foster,



             daily    N.P.

 

 Carvedilol    Active  Tablets  25mg  180ta  1 tab by    Yu CONDON



           bs  mouth    Eulalio,



             twice a    N.P.



             day    

 

 Desloratadine    Active  Tablets  5mg  90tab  1 by mouth    Joseph        s  every day    LIVAN Han M.D.,FACP

 

 Cpap Mask And    Active  Device    1unit  cpap  G47.33  [facility]



 Supplies  /2017        s  supplies -    Zayas M.D.



             cushion,    



             tubing,    



             filters,    



             for sleep    



             apnea dx    



             780.57    

 

 Nitrostat    Active  Tablets Sub  0.4mg  25tab  one sl            s  q5min up    [place],



             to 3 doses    M.D.



             as needed    

 

 Aspirin    Active  Tablets  81mg    1 po qd                  Ordering



                 Provider

 

 Nasonex    Active  Suspension  50mcg/Act  51gm  1 sprays              to each    Wili castano M.D.



             daily    

 

 Celecoxib    Active  Capsules  200mg  90cap  Take 1            s  Capsule    Pachikara



             Daily With    , M.D.



             Food    

 

 Garlic    Active  Capsules  1000mg    1 cap po    Unknown



   /0000          daily    

 

 Fish Oil + D3    Active  Capsules  2855-7583    1 cap po    Unknown



   /0000      mg-Unit    daily    

 

 Glucosamine    Active    1500mg/12    1 tablet    Unknown



 Chondroitin  /0000      00mg    po twice    



             daily    



             Am/PM    

 

 Multi Vitamin    Active  Tablets      1 tablet    Unknown



 Daily (           po daily    



 To Centrum Silver                



 )                

 

 Coq-10    Active  Capsules  100mg    1 capsule    Unknown



   /0000          by mouth    



             daily    

 

 Coumadin    Active  Tablets  2mg    as              directed (    Jc,



             taking 2    M.D.



             tablets (    



             4mg) 7x    



             day per    



             week)    



             adjustment    



             [facility]    

 

 Amiodarone HCL    Active  Tablets  200mg  60tab  1 tab by    Yu CONDON



   /        s  mouth    Eulalio,



             daily.    N.P.

 

 Diphenhydramine-A    Active  Tablets  25-500mg    prn    Unknown



 pap (Sleep)                

 

 Acetaminophen    Active  Tablets  325mg    2 tablets    Unknown



   /0000          by mouth    



             every 8    



             hours as    



             needed for    



             pain/fever    

 

                 

 

 Losartan  10/04  Hx  Tablets  160-25mg  30tab  1 by mouth    Yu CONDON



 Potassium/Hydroch          s  every day    Eulalio



 lorothiazide  -              N.P.



   10/05              



   /2018              

 

 Atacand    Hx  Tablets  16mg  90tab  1 tab by    Saadia



           s  mouth    Jc,



   -          every day.    M.D.



                 

 

 Hydrochlorothiazi    Hx  Tablets  25mg  90tab  1 by mouth  I10  Saadia



         s  every day    Jc,



   -              M.D.



                 

 

 Carvedilol    Hx  Tablets  12.5mg  180ta  2 tabs by    Yu CONDON



   /        bs  [facility]    Eulalio,



   -          twice a    N.P.



   08/03          day    



   /2018              

 

 Carvedilol    Hx  Tablets  6.25mg  180ta  2 by mouth    Saadia



           bs  twice a    Jc,



   -          day    M.D.



                 

 

 Cartia XT    Hx  Caps ER  120mg  30cap  once daily        24HR    s      Wili



   -              , M.D.



   10/04              



   /2018              

 

 Colchicine    Hx  Capsules  0.6mg  180ca  take one    Frank        ps  by mouth    [place]



   -          once day    Gamaliel,



                 M.D.,



                 FACC,



                 FASNC

 

 Ocycodone    Hx  Tablets  5mg  30tab  1-2 tabs    Saadia



 -Acetaminophen 5        s  by mouth    Jc,



 325  -          every 4-6    M.D.



             hours as    



   /2018          needed(nev    



             er filled)    

 

 Diovan    Hx  Tablets  80mg  180ta  2 by mouth    Yu S.



   /        bs  every day    Eulalio,



   -              N.P.



                 

 

 Lasix    Hx  Tablets  20mg  30tab  1 by mouth  I10          s  every    [place]



   -          Monday    [place],



       M.D.,



             and [facility]

 

 Rosuvastatin    Hx  Tablets  5mg  90tab  take 1  E78.2  [facility]



 Calcium  /2017        s  tablet    Pachikara



   -          once daily    , M.D.



             at    



             bedtime-    



             on hold    

 

 Cardizem CD    Hx  Caps ER  180mg  90cap  once daily  I10      24HR    s  (pt    Pachikara



   -          stopped    , M.D.



             taking)    



                 

 

 Montelukast    Hx  Tablets  10mg  30tab  once daily  R06.02  Jimbo



 Sodium          s  (not    Pachikara



   -          taking)    , M.D.



                 

 

 Proair HFA    Hx  Aerosol  108(90Bas  25.5g  2 puffs ih  J45.909        e)  m  every 4    Pachikara



   -      mcg/Act    hours as    , M.D.



             needed    



             (not    



             taking)    

 

 Tizanidine HCL    Hx  Capsules  4mg  30cap  1 by mouth  M50.10          s  at bedtime    Pach[place]ara



   -          (not    , M.D.



             taking)    



                 

 

 Rosuvastatin    Hx  Tablets  5mg  90tab  Take 1    [place]



 Calcium  /2016        s  Tablet    Pachikara



   -          Once Daily    , M.D.



             AT Bedtime    



   /              

 

 Diazepam    Hx  Tablets  5mg  45tab  1 tab  M51.16          s  every 8h    Pachikara



   -          prrn    , M.DSaloni



                 

 

 Medrol (Rolan)    Hx  Tablets  4mg  1tabs  as    Other



             directed    Ordering



   -              Provider



                 

 

 Valium    Hx  Tablets  5mg  14tab  1 tab tid    Unknown



           s  prn    



   -              



                 

 

 Tizanidine HCL    Hx  Tablets  4mg  30tab  twice a  M51.16          s  day    Wili



   -              , M.DSaloni



                 

 

 Meloxicam    Hx  Tablets  15mg  30tab  once daily  M51.16          s  with food    Wili Garvin M.D.



                 

 

 Tizanidine HCL    Hx  Tablets  4mg  30tab  twice a  M51.16          s  day as    Pachikara



   -          needed    , M.DSaloni



                 

 

 Potassium    Hx  Tablets ER  10Meq  90tab  1  by  I10  Saadia



 Chloride ER          s  mouth    [place],



   -          every day    M.D.



                 

 

 Klor-Con 10    Hx  Tablets ER  10Meq  90tab  1 tablets  401.1  Saadia



           s  by mouth    [place],



   -          daily .    M.D.



                 

 

 Metoprolol    Hx  Tablets ER  50mg  180ta  1 by mouth    Saadia



 Succinate     24HR    bs  twice a    Jc,



   -          day    M.D.



                 

 

 Lipitor    Hx  Tablets  20mg  90tab  1 tablet 3    Saadia



           s  times per    [place],



   -          week (on    M.D.



             hold 3/24)    



                 

 

 Lipitor    Hx  Tablets  20mg    one tab po              qhs    Ordering



   -              Provider



                 

 

 Plavix    Hx  Tablets  75mg  90tab  1 by mouth            s  every day    [place],



   -              M.D.



                 

 

 Metoprolol    Hx  Tablets ER  50mg  30tab  1 tab am,    Other



 Succinate     24HR    s  1/2 tab pm    Ordering



   -              Provider



                 

 

 Lipitor    Hx  Tablets  10mg  100ta  1 po hs            bs      Ordering



   -              Provider



                 

 

 Nitroglycerin    Hx  Tablets Sub  1/150  50tab  1 sl            s  q5mins x3    Ordering



   -          prn for    Provider



             chest pain    



                 

 

 Garlic    Hx  Capsules  450mg    1 po qd    Emily              Virgie Coleman M.D.



                 

 

 Xyzal    Hx  Tablets  5mg  90tab  1 po daily  477.9          s  as needed    Wili Garvin M.D.



                 

 

 Diovan HCT    Hx  Tablets  160-25mg  90tab  1/2 tablet    Kavin SSaloni



           s  by mouth    Lio,



   -          every day    DO [facility]



                 

 

 Amlodipine    Hx  Tablets  10mg  90tab  1 po qd    Jimbo



 Besylate          s      Wili Garvin M.D.



   10/01              



   /2012              

 

 Fexofenadine HCL    Hx  Tablets  60mg  60tab  take one    Jimbo



           s  tablet by    Wili



   -          mouth    , M.D.



   10/01          twice           day as    



             needed    

 

 Multivitamin &    Hx  Liquid      1 per day    Jimbo



 Mineral                Wili Garvin M.D.



                 

 

 Glucosamine    Hx  Capsules  1500Com    1 po qd    Jimbo



 Chondroitin 1500                Wili Garvin M.D.



                 

 

 Fish Oil    Hx  Capsules  1000mg    1 capsule    Jimbo



 Concentrate            qd    Wili Garvin M.D.



                 

 

 Levocetirizine    Hx  Tablets  5mg  90tab  1 by mouth    Jimbo



 Dihydrochloride  /        s  every day    Wili Garvin M.D.



                 

 

 Acetaminophen    Hx    500mg    2 tablet    Unknown



   /0000          po q Am,    



   -          with an    



             add          2 tablet    



             afternoon    



             if needed    



             used for    



             discomfort    



             in back    

 

 Cyclobenzaprine    Hx  Tablets  10mg    one by    Unknown



 HCL  /0000          mouth    



   -          three    



             times           day as    



             needed    



             spasm    

 

 Gabapentin    Hx  Capsules  300mg    1 by mouth    Unknown



   /0000          two times    



   -          a day    



                 

 

 Tramadol HCL    Hx  Tablets  50mg    1-2    Unknown



   /0000          tablets    



   -          every 6    



   /24          hours as    



   /2016          needed    

 

 Metoprolol    Hx  Tablets ER  50mg  90tab  1 by mouth    Saadia



 Succinate ER  /0000    24HR    s  every    [place],



   -          evening    M.DSaloni



   05/15              



   /2018              

 

 Losartan    Hx  Tablets  25mg    1/2 by    Unknown



 Potassium  /0000          mouth    



   -          every day    



                 

 

 Acetaminophen    Hx  Solution  325mg/10.    2 tabs    Unknown



   /0000      15ML    every 4    



   -          hours as    



             needed for    



             pain    

 

 Metoprolol    Hx  Tablets  50mg    1/2 by    Unknown



 Tartrate  /0000          mouth    



   -          twice    



   06/15          daily    



   /2018              

 

 Furosemide  0000  Hx  Tablets  40mg    1 by mouth    Unknown



   /0000          every day    



   -          for 5 days    



             ([facility]    



   /          re    



             prescribed    



             on 18    



             for 5    



             days)    

 

 Centrum Silver  00/00  Hx  Tablets      1 by mouth    Unknown



   /0000          every day    



   -              



                 







Medications Administered in Office







 Medication  Date  Status  Form  Strength  Qnty  SIG  Indications  Ordering



                 Provider

 

 Depomedrol    Administered  Injection          Radha



 80MG  Michael Maldonado M.D.







Immunizations







 CPT Code  Status  Date  Vaccine  Reaction  Lot #

 

 [id number]  Given  2017  Influenza Virus Vaccine,  no immediatae reaction  
[id number]



       Quadrivalent, Split Virus,  noted .. hh  



       Im Use    

 

 85352  Given  10/09/2015  Influenza Virus Vaccine,    [id number]



       Quadrivalent, Split,    



       Preservative Free    

 

 05768  Given  10/01/2014  Influenza Virus Vaccine,    [id number]



       Quadrivalent, Split,    



       Preservative Free    

 

 78589  Given  10/30/2013  Flu Vaccine Split Virus    [id number]



       Preservative Free For    



       Indiv 3Yr Older    

 

   Given  2012  Fluzone Vaccine    







Vital Signs







 Date  Vital  Result  Comment

 

 2018  Height  71 inches  5'11"









 Weight  277.00 lb  with shoes

 

 Heart Rate  64 /min  

 

 BP Systolic Sitting  164 mmHg  lue reg cuff

 

 BP Diastolic Sitting  98 mmHg  lue reg cuff

 

 BP Systolic Standing  160 mmHg  lue reg cuff

 

 BP Diastolic Standing  98 mmHg  lue reg cuff

 

 Respiratory Rate  12 /min  

 

 BMI (Body Mass Index)  38.6 kg/m2  

 

 Ejection Fraction  55-60%  echo. 2018









 10/05/2018  Height  71 inches  5'11"









 Weight  267.00 lb  

 

 Heart Rate  65 /min  

 

 BP Systolic Sitting  130 mmHg  

 

 BP Diastolic Sitting  85 mmHg  

 

 BP Systolic Standing  120 mmHg  

 

 BP Diastolic Standing  90 mmHg  

 

 Respiratory Rate  14 /min  

 

 Pain Level  0  at this time

 

 O2 % BldC Oximetry  97 %  

 

 BMI (Body Mass Index)  37.2 kg/m2  









 2018  Height  71 inches  5'11"









 Weight  271.00 lb  

 

 Heart Rate  69 /min  

 

 BP Systolic Sitting  142 mmHg  

 

 BP Diastolic Sitting  93 mmHg  

 

 Body Temperature  97.8 F  

 

 O2 % BldC Oximetry  95 %  

 

 BMI (Body Mass Index)  37.8 kg/m2  









 2018  Height  71 inches  5'11"









 Weight  270.19 lb  

 

 Heart Rate  70 /min  

 

 BP Systolic Sitting  128 mmHg  lue lg cuff

 

 BP Diastolic Sitting  80 mmHg  lue lg cuff

 

 BP Systolic Standing  150 mmHg  

 

 BP Diastolic Standing  100 mmHg  

 

 Respiratory Rate  16 /min  

 

 BMI (Body Mass Index)  37.7 kg/m2  

 

 Ejection Fraction  55-60%  2018 echo









 2018  Height  71 inches  5'11"









 Weight  270.00 lb  with shoes

 

 Heart Rate  56 /min  

 

 BP Systolic Sitting  130 mmHg  Lue lg cuff

 

 BP Diastolic Sitting  90 mmHg  Lue lg cuff

 

 BP Systolic Standing  150 mmHg  Lue lg cuff

 

 BP Diastolic Standing  90 mmHg  Lue lg cuff

 

 Respiratory Rate  16 /min  

 

 BMI (Body Mass Index)  37.7 kg/m2  

 

 Ejection Fraction  55-60%  date 18 ECHO









 2018  Height  71 inches  5'11"









 Weight  266.00 lb  

 

 Heart Rate  88 /min  

 

 BP Systolic Sitting  168 mmHg  lue lg cuff

 

 BP Diastolic Sitting  80 mmHg  lue lg cuff

 

 BP Systolic Standing  138 mmHg  lue lg cuff

 

 BP Diastolic Standing  70 mmHg  lue lg cuff

 

 Respiratory Rate  16 /min  

 

 BMI (Body Mass Index)  37.1 kg/m2  

 

 Ejection Fraction  55-60%  2018 echo









 2018  Height  71 inches  5'11"









 Weight  271.00 lb  w/ shoes

 

 Heart Rate  72 /min  

 

 BP Systolic Sitting  134 mmHg  Lue lg cuff

 

 BP Diastolic Sitting  86 mmHg  Lue lg cuff

 

 BP Systolic Standing  130 mmHg  Lue

 

 BP Diastolic Standing  86 mmHg  Lue

 

 Respiratory Rate  16 /min  

 

 BMI (Body Mass Index)  37.8 kg/m2  

 

 Ejection Fraction  55-60%  as of 18 echo









 2018  Height  71 inches  5'11"









 Heart Rate  72 /min  

 

 BP Systolic  173 mmHg  home cuff

 

 BP Diastolic  112 mmHg  home cuff

 

 BP Systolic Sitting  152 mmHg  moody reg cuff

 

 BP Diastolic Sitting  76 mmHg  moody reg cuff

 

 BP Systolic Standing  148 mmHg  moody reg cuff

 

 BP Diastolic Standing  80 mmHg  moody reg cuff









 2018  Height  71 inches  5'11"









 Weight  270.00 lb  

 

 Heart Rate  63 /min  

 

 BP Systolic  167 mmHg  

 

 BP Diastolic  85 mmHg  

 

 O2 % BldC Oximetry  96 %  

 

 BMI (Body Mass Index)  37.7 kg/m2  









 2018  Height  71 inches  5'11"









 Weight  268.00 lb  no shoes

 

 Heart Rate  68 /min  

 

 BP Systolic Sitting  144 mmHg  Lue large cuff

 

 BP Diastolic Sitting  84 mmHg  Lue large cuff

 

 BP Systolic Standing  132 mmHg  Lue

 

 BP Diastolic Standing  90 mmHg  Lue

 

 Respiratory Rate  16 /min  

 

 BMI (Body Mass Index)  37.4 kg/m2  

 

 Ejection Fraction  55-60%  2018  Height  71 inches  5'11"









 Weight  265.00 lb  with shoes

 

 Heart Rate  56 /min  

 

 BP Systolic Sitting  140 mmHg  Lue lg cuff

 

 BP Diastolic Sitting  80 mmHg  Lue lg cuff

 

 BP Systolic Standing  128 mmHg  Lue lg cuff

 

 BP Diastolic Standing  80 mmHg  Lue lg cuff

 

 Respiratory Rate  16 /min  

 

 BMI (Body Mass Index)  37.0 kg/m2  

 

 Ejection Fraction  55-60%  date 18 ECHO









 2018  Height  71 inches  5'11"









 Weight  276.00 lb  with shoes

 

 Heart Rate  62 /min  

 

 BP Systolic Sitting  130 mmHg  Lue lg cuff

 

 BP Diastolic Sitting  94 mmHg  Lue lg cuff

 

 BP Systolic Standing  160 mmHg  Lue lg cuff

 

 BP Diastolic Standing  100 mmHg  Lue lg cuff

 

 BMI (Body Mass Index)  38.5 kg/m2  









 2018  Height  71 inches  5'11"









 Weight  272.00 lb  

 

 Heart Rate  74 /min  

 

 BP Systolic  163 mmHg  Lue pt own cuff

 

 BP Diastolic  104 mmHg  Lue pt own cuff

 

 BP Systolic Sitting  152 mmHg  Lue large cuff

 

 BP Diastolic Sitting  92 mmHg  Lue large cuff

 

 BP Systolic Standing  152 mmHg  Lue large cuff

 

 BP Diastolic Standing  96 mmHg  Lue large cuff

 

 Respiratory Rate  16 /min  

 

 BMI (Body Mass Index)  37.9 kg/m2  









 2018  Height  70 inches  5'10"









 Weight  268.00 lb  

 

 Heart Rate  84 /min  

 

 BP Systolic Sitting  126 mmHg  Lue large cuff

 

 BP Diastolic Sitting  82 mmHg  Lue large cuff

 

 BP Systolic Standing  122 mmHg  Lue

 

 BP Diastolic Standing  80 mmHg  Lue

 

 Respiratory Rate  18 /min  

 

 BMI (Body Mass Index)  38.4 kg/m2  

 

 Ejection Fraction  55-60%  2017  Weight  272.00 lb  









 Heart Rate  76 /min  

 

 BP Systolic  128 mmHg  

 

 BP Diastolic  80 mmHg  

 

 Body Temperature  97.4 F  

 

 O2 % BldC Oximetry  96 %  









 2017  Height  70 inches  5'10"









 Weight  265.00 lb  

 

 Heart Rate  73 /min  

 

 BP Systolic  136 mmHg  

 

 BP Diastolic  80 mmHg  

 

 Body Temperature  96.9 F  

 

 O2 % BldC Oximetry  96 %  

 

 BMI (Body Mass Index)  38.0 kg/m2  









 2017  Height  70 inches  5'10"









 Weight  259.00 lb  

 

 Heart Rate  64 /min  

 

 BP Systolic Sitting  110 mmHg  Rue large cuff

 

 BP Diastolic Sitting  74 mmHg  Rue large cuff

 

 BP Systolic Standing  104 mmHg  Rue

 

 BP Diastolic Standing  78 mmHg  Rue

 

 Respiratory Rate  16 /min  

 

 BMI (Body Mass Index)  37.2 kg/m2  

 

 Ejection Fraction  55-60%  2017  Weight  277.00 lb  









 Heart Rate  68 /min  

 

 BP Systolic Sitting  120 mmHg  

 

 BP Diastolic Sitting  90 mmHg  

 

 O2 % BldC Oximetry  97 %  









 2017  Weight  277.25 lb  









 Heart Rate  75 /min  

 

 BP Systolic  124 mmHg  

 

 BP Diastolic  68 mmHg  

 

 Body Temperature  97.2 F  

 

 O2 % BldC Oximetry  96 %  









 2017  Height  70 inches  5'10"









 Weight  273.00 lb  

 

 Heart Rate  64 /min  

 

 BP Systolic  120 mmHg  

 

 BP Diastolic  98 mmHg  

 

 Body Temperature  97.7 F  

 

 O2 % BldC Oximetry  98 %  

 

 BMI (Body Mass Index)  39.2 kg/m2  









 2017  Height  70 inches  5'10"









 Weight  274.00 lb  

 

 Heart Rate  86 /min  

 

 BP Systolic Sitting  118 mmHg  right arm, large cuff

 

 BP Diastolic Sitting  82 mmHg  right arm, large cuff

 

 BP Systolic Standing  110 mmHg  right arm, large cuff

 

 BP Diastolic Standing  78 mmHg  right arm, large cuff

 

 Respiratory Rate  20 /min  

 

 BMI (Body Mass Index)  39.3 kg/m2  

 

 Ejection Fraction  55-60%  2017  Weight  277.25 lb  









 Heart Rate  73 /min  

 

 BP Systolic Sitting  118 mmHg  

 

 BP Diastolic Sitting  68 mmHg  

 

 Body Temperature  97.0 F  

 

 O2 % BldC Oximetry  94 %  









 2017  Height  70 inches  5'10"









 Weight  274.00 lb  with shoes

 

 Heart Rate  80 /min  

 

 BP Systolic Sitting  144 mmHg  Lue large cuff

 

 BP Diastolic Sitting  90 mmHg  Lue large cuff

 

 BP Systolic Standing  142 mmHg  Lue large cuff

 

 BP Diastolic Standing  98 mmHg  Lue large cuff

 

 Respiratory Rate  18 /min  

 

 BMI (Body Mass Index)  39.3 kg/m2  

 

 Ejection Fraction  60-65%  8/13/15









 2017  Height  70 inches  5'10"









 Weight  274.00 lb  

 

 Heart Rate  70 /min  

 

 BP Systolic  120 mmHg  

 

 BP Diastolic  70 mmHg  

 

 Body Temperature  98.2 F  

 

 O2 % BldC Oximetry  96 %  

 

 BMI (Body Mass Index)  39.3 kg/m2  









 2016  Weight  272.00 lb  









 Heart Rate  68 /min  

 

 BP Systolic Sitting  128 mmHg  

 

 BP Diastolic Sitting  84 mmHg  

 

 Respiratory Rate  15 /min  

 

 Body Temperature  98.0 F  

 

 O2 % BldC Oximetry  98 %  









 2016  Height  70.25 inches  5'10.25"









 Weight  273.00 lb  

 

 Heart Rate  78 /min  

 

 BP Systolic Sitting  126 mmHg  

 

 BP Diastolic Sitting  80 mmHg  

 

 Pain Level  2  

 

 BMI (Body Mass Index)  38.9 kg/m2  









 2016  Height  70.25 inches  5'10.25"









 Weight  273.00 lb  

 

 Heart Rate  86 /min  

 

 BP Systolic Sitting  140 mmHg  

 

 BP Diastolic Sitting  88 mmHg  

 

 Pain Level  5  

 

 BMI (Body Mass Index)  38.9 kg/m2  









 2016  Height  70.25 inches  5'10.25"









 Weight  275.00 lb  

 

 Heart Rate  93 /min  

 

 BP Systolic  138 mmHg  

 

 BP Diastolic  82 mmHg  

 

 Body Temperature  98.4 F  

 

 O2 % BldC Oximetry  96 %  

 

 BMI (Body Mass Index)  39.2 kg/m2  









 2016  Weight  279.50 lb  









 Heart Rate  71 /min  

 

 BP Systolic Sitting  134 mmHg  

 

 BP Diastolic Sitting  81 mmHg  

 

 Body Temperature  98.6 F  

 

 Pain Level  5  

 

 O2 % BldC Oximetry  96 %  









 2016  Height  70.25 inches  5'10.25"









 Weight  280.00 lb  

 

 Heart Rate  78 /min  

 

 BP Systolic Sitting  129 mmHg  

 

 BP Diastolic Sitting  85 mmHg  

 

 Body Temperature  96.9 F  

 

 O2 % BldC Oximetry  96 %  

 

 BMI (Body Mass Index)  39.9 kg/m2  









 12/15/2015  Height  70.25 inches  5'10.25"









 Weight  277.00 lb  with shoes

 

 Heart Rate  70 /min  

 

 BP Systolic Sitting  132 mmHg  LA, Lg cuff

 

 BP Diastolic Sitting  94 mmHg  LA, Lg cuff

 

 BP Systolic Standing  130 mmHg  LA

 

 BP Diastolic Standing  94 mmHg  LA

 

 Respiratory Rate  16 /min  

 

 BMI (Body Mass Index)  39.5 kg/m2  

 

 Ejection Fraction  60-65%  08/13/15









 2015  Height  70.25 inches  5'10.25"









 Weight  266.00 lb  with shoes

 

 Heart Rate  62 /min  

 

 BP Systolic Sitting  120 mmHg  LA lg cuff

 

 BP Diastolic Sitting  80 mmHg  LA lg cuff

 

 BP Systolic Standing  120 mmHg  LA lg cuff

 

 BP Diastolic Standing  82 mmHg  LA lg cuff

 

 Respiratory Rate  16 /min  

 

 BMI (Body Mass Index)  37.9 kg/m2  

 

 Ejection Fraction  60-65%  date 8/13/15 ECHO









 2015  Weight  268.00 lb  









 Heart Rate  80 /min  

 

 BP Systolic Sitting  133 mmHg  

 

 BP Diastolic Sitting  86 mmHg  









 2015  Height  70.25 inches  5'10.25"









 Weight  260.31 lb  no shoes

 

 Heart Rate  74 /min  

 

 BP Systolic Sitting  126 mmHg  LA large cuff

 

 BP Diastolic Sitting  88 mmHg  LA large cuff

 

 BP Systolic Standing  122 mmHg  LA

 

 BP Diastolic Standing  86 mmHg  LA

 

 Respiratory Rate  16 /min  

 

 BMI (Body Mass Index)  37.1 kg/m2  









 2014  Weight  266.00 lb  









 Heart Rate  74 /min  

 

 BP Systolic Sitting  110 mmHg  

 

 BP Diastolic Sitting  60 mmHg  

 

 Body Temperature  98.1 F  









 10/01/2014  Weight  276.00 lb  









 Heart Rate  66 /min  

 

 BP Systolic Sitting  130 mmHg  

 

 BP Diastolic Sitting  84 mmHg  









 2014  Heart Rate  78 /min  









 BP Systolic Sitting  132 mmHg  L arm Large cuff

 

 BP Diastolic Sitting  90 mmHg  L arm Large cuff

 

 BP Systolic Standing  128 mmHg  

 

 BP Diastolic Standing  90 mmHg  

 

 Respiratory Rate  18 /min  









 2014  Height  610.5 inches  50'10.50"









 Weight  263.00 lb  without shoes

 

 BP Systolic Sitting  152 mmHg  LA Lg cuff

 

 BP Diastolic Sitting  102 mmHg  LA Lg cuff

 

 BP Systolic Standing  148 mmHg  LA lg cuff

 

 BP Diastolic Standing  104 mmHg  LA lg cuff

 

 Respiratory Rate  17 /min  

 

 BMI (Body Mass Index)  0.5 kg/m2  









 2014  Height  71 inches  5'11"









 Weight  261.50 lb  

 

 Heart Rate  80 /min  Regular

 

 BP Systolic Sitting  122 mmHg  His machine: 125/88

 

 BP Diastolic Sitting  90 mmHg  His machine: 125/88

 

 BMI (Body Mass Index)  36.5 kg/m2  









 2013  Weight  260.00 lb  

 

 2013  Weight  260.50 lb  









 Heart Rate  69 /min  

 

 BP Systolic Sitting  132 mmHg  

 

 BP Diastolic Sitting  86 mmHg  









 2012  Height  70.25 inches  5'10.25"









 Weight  270.00 lb  

 

 Heart Rate  80 /min  

 

 BP Systolic Sitting  138 mmHg  

 

 BP Diastolic Sitting  82 mmHg  

 

 BMI (Body Mass Index)  38.5 kg/m2  









 2012  Height  70.25 inches  5'10.25"









 Weight  265.00 lb  

 

 Heart Rate  94 /min  

 

 BP Systolic Sitting  138 mmHg  

 

 BP Diastolic Sitting  82 mmHg  

 

 BMI (Body Mass Index)  37.7 kg/m2  









 10/01/2012  Height  70.25 inches  5'10.25"









 Weight  258.00 lb  

 

 Heart Rate  80 /min  

 

 BP Systolic Sitting  128 mmHg  

 

 BP Diastolic Sitting  94 mmHg  

 

 BMI (Body Mass Index)  36.8 kg/m2  









 2012  Height  70.25 inches  5'10.25"









 Weight  265.00 lb  

 

 Heart Rate  102 /min  

 

 BP Systolic Sitting  126 mmHg  

 

 BP Diastolic Sitting  84 mmHg  

 

 BMI (Body Mass Index)  37.7 kg/m2  









 2011  Height  70.25 inches  5'10.25"









 Weight  257.50 lb  

 

 Heart Rate  80 /min  

 

 BP Systolic Sitting  100 mmHg  

 

 BP Diastolic Sitting  80 mmHg  

 

 BMI (Body Mass Index)  36.7 kg/m2  









 2011  Height  70.75 inches  5'10.75"









 Weight  274.00 lb  

 

 Heart Rate  80 /min  

 

 BP Systolic Sitting  148 mmHg  

 

 BP Diastolic Sitting  96 mmHg  

 

 BMI (Body Mass Index)  38.5 kg/m2  







Results







 Test  Date  Test  Result  H/L  Range  Note

 

 Inr/Protime  10/22/2018  Inr  1.02    0.77-1.02  

 

 CBC Auto Diff  10/05/2018  White Blood Count  4.6 10^3/uL    3.5-10.8  









 Red Blood Count  4.45 10^6/uL    4.00-5.40  

 

 Hemoglobin  15.0 g/dL    14.0-18.0  

 

 Hematocrit  44 %    42-52  

 

 Mean Corpuscular Volume  98 fL  High  80-94  

 

 Mean Corpuscular Hemoglobin  34 pg  High  27-31  

 

 Mean Corpuscular HGB Conc  34 g/dL    31-36  

 

 Red Cell Distribution Width  16 %  High  10.5-15  

 

 Platelet Count  194 10^3/uL    150-450  

 

 Mean Platelet Volume  8.3 um3    7.4-10.4  

 

 Abs Neutrophils  2.5 10^3/uL    1.5-7.7  

 

 Abs Lymphocytes  1.3 10^3/uL    1.0-4.8  

 

 Abs Monocytes  0.7 10^3/uL    0-0.8  

 

 Abs Eosinophils  0 10^3/uL    0-0.6  

 

 Abs Basophils  0 10^3/uL    0-0.2  

 

 Abs Nucleated RBC  0 10^3/uL      

 

 Granulocyte %  54.8 %    38-83  

 

 Lymphocyte %  29.2 %    25-47  

 

 Monocyte %  14.6 %  High  0-7  

 

 Eosinophil %  0.9 %    0-6  

 

 Basophil %  0.5 %    0-2  

 

 Nucleated Red Blood Cells %  0.2      









 Basic Metabolic Panel  10/05/2018  Sodium  140 mmol/L    135-145  









 Potassium  4.3 mmol/L    3.5-5.0  

 

 Chloride  105 mmol/L    101-111  

 

 Co2 Carbon Dioxide  27 mmol/L    22-32  

 

 Anion Gap  8 mmol/L    2-11  

 

 Glucose  96 mg/dL      

 

 Blood Urea Nitrogen  25 mg/dL  High  6-24  

 

 Creatinine  0.97 mg/dL    0.67-1.17  

 

 BUN/Creatinine Ratio  25.8  High  8-20  

 

 Calcium  9.6 mg/dL    8.6-10.3  

 

 Egfr Non-  79.2    >60  

 

 Egfr   95.9    >60  1









 Laboratory test finding  10/05/2018  Magnesium  2.3 mg/dL    1.9-2.7  

 

 Inr/Protime  2018  Inr  2.58  High  0.77-1.02  

 

 Inr/Protime  2018  Inr  2.21  High  0.77-1.02  

 

 Ua Routine  2018  Ua Specific Gravity  1.020      









 Ua PH  5      

 

 Ua Color  dark yellow      

 

 Ua Appera  clear      

 

 Ua WBC  -      

 

 Ua Protein  trace      

 

 Ua Glucose  50      

 

 Ua Ketones  neg      

 

 Ua Bilirubin  neg      

 

 Ua Urobilinogen  neg      

 

 Ua Nitrite  neg      

 

 Ua Occult Blood  250      









 Urinalysis Profile  2018  Urine Color  Yellow      2









 Urine Appearance  Cloudy      2

 

 Urine Specific Gravity  1.023    1.010-1.030  2

 

 Urine pH  5.0    5-9  2

 

 Urine Urobilinogen  Negative    Negative  2

 

 Urine Ketones  Negative    Negative  2

 

 Urine Protein  Negative    Negative  2

 

 Urine Leukocytes  Negative    Negative  2

 

 Urine Blood  3+    Negative  2

 

 *  *    Negative  2, 3

 

 Urine Nitrite  Negative    Negative  2

 

 Urine Bilirubin  Negative    Negative  2

 

 Urine Glucose  1+(50 mg/dL)    Negative  2

 

 Urine White Blood Cell  2+(11-20/hpf)    Absent  2

 

 Urine Red Blood Cell  3+(>10/hpf)    Absent  2

 

 Urine Bacteria  Absent    Absent  2

 

 Urine Uric Acid Crystals  Present    Absent  2









 Urine Culture And  2018  Urine Culture  SEE RESULT BELOW      2, 4



 Sensitivities            

 

 CBC Auto Diff  2018  White Blood Count  7.0 10^3/uL    3.5-10.8  









 Red Blood Count  4.19 10^6/uL    4.00-5.40  

 

 Hemoglobin  14.0 g/dL    14.0-18.0  

 

 Hematocrit  41 %  Low  42-52  

 

 Mean Corpuscular Volume  99 fL  High  80-94  

 

 Mean Corpuscular Hemoglobin  33 pg  High  27-31  

 

 Mean Corpuscular HGB Conc  34 g/dL    31-36  

 

 Red Cell Distribution Width  15 %    10.5-15  

 

 Platelet Count  183 10^3/uL    150-450  

 

 Mean Platelet Volume  8.2 um3    7.4-10.4  

 

 Abs Neutrophils  5.4 10^3/uL    1.5-7.7  

 

 Abs Lymphocytes  1.0 10^3/uL    1.0-4.8  

 

 Abs Monocytes  0.5 10^3/uL    0-0.8  

 

 Abs Eosinophils  0 10^3/uL    0-0.6  

 

 Abs Basophils  0 10^3/uL    0-0.2  

 

 Abs Nucleated RBC  0 10^3/uL      

 

 Granulocyte %  77.4 %    38-83  

 

 Lymphocyte %  14.7 %  Low  25-47  

 

 Monocyte %  6.8 %    0-7  

 

 Eosinophil %  0.6 %    0-6  

 

 Basophil %  0.5 %    0-2  

 

 Nucleated Red Blood Cells %  0      









 Comp Metabolic Panel  2018  Sodium  143 mmol/L    135-145  









 Potassium  3.7 mmol/L    3.5-5.0  

 

 Chloride  104 mmol/L    101-111  

 

 Co2 Carbon Dioxide  30 mmol/L    22-32  

 

 Anion Gap  9 mmol/L    2-11  

 

 Glucose  154 mg/dL  High    

 

 Blood Urea Nitrogen  24 mg/dL    6-24  

 

 Creatinine  1.13 mg/dL    0.67-1.17  

 

 One Over Creatinine  0.88 mg/dL    0.67-1.17  

 

 BUN/Creatinine Ratio  21.2  High  8-20  

 

 Calcium  9.4 mg/dL    8.6-10.3  

 

 Total Protein  6.4 g/dL    6.4-8.9  

 

 Albumin  4.4 g/dL    3.2-5.2  

 

 Globulin  2.0 g/dL    2-4  

 

 Albumin/Globulin Ratio  2.2    1-3  

 

 Total Bilirubin  0.40 mg/dL    0.2-1.0  

 

 Alkaline Phosphatase  52 U/L      

 

 Alt  25 U/L    7-52  

 

 Ast  23 U/L    13-39  

 

 Egfr Non-  66.7    >60  

 

 Egfr   80.6    >60  5









 Laboratory test finding  2018  Uric Acid  4.9 mg/dL    4.4-7.6  









 Phosphorus  3.4 mg/dL    2.5-5.0  

 

 Magnesium  2.1 mg/dL    1.9-2.7  









 Pthi  2018  Calcium (PTH Intact)  9.5 mg/dL    8.6-10.3  









 PTH Intact  4.4 pmol/L    1.3-9.3  









 Creatinine Clearance  2018  Creatinine  1.12 mg/dL  High  0.51-0.95  









 Urine Collection Time  24 hr      

 

 Urine Total Volume  2350 mL      

 

 Urine Random Creatinine  105.26 mg/dL      

 

 Creatinine Clearance  153 mL/min  High    









 Sodium 24HR Urine  2018  Urine Collection Time  24 hr      









 Urine Total Volume  2350 mL      

 

 Urine Random Sodium  108 mmol/L      

 

 Urine Sodium/24HR  253 mmol/24  High    









 Total Protein 24HR Urine  2018  Urine Random Total Protein  5 mg/dL      









 Urine Total Protein/24HR  (SEE NOTE) mg/24Hr    0-165  6









 Laboratory test finding  2018  Renin  <0.6 ng/mL/h      7









 Aldosterone  <4.0 ng/dL    <=21  8









 Uric Acid 24HR Urine  2018  Uric Acid, U  964 mg/24h  High    9









 Collection Duration  24 h      

 

 Urine Volume  2350 mL      10

 

 Uric Acid Concentration  41 mg/dL      11









 Inr/Protime  2018  Inr  2.33  High  0.77-1.02  

 

 Inr/Protime  2018  Inr  2.90  High  0.77-1.02  

 

 Basic Metabolic Panel  2018  Sodium  140 mmol/L    135-145  









 Potassium  3.8 mmol/L    3.5-5.0  

 

 Chloride  104 mmol/L    101-111  

 

 Co2 Carbon Dioxide  28 mmol/L    22-32  

 

 Anion Gap  8 mmol/L    2-11  

 

 Glucose  101 mg/dL  High    

 

 Blood Urea Nitrogen  28 mg/dL  High  6-24  

 

 Creatinine  0.99 mg/dL    0.67-1.17  

 

 BUN/Creatinine Ratio  28.3  High  8-20  

 

 Calcium  9.6 mg/dL    8.6-10.3  

 

 Egfr Non-  77.6    >60  

 

 Egfr   93.9    >60  12









 Laboratory test finding  2018  Magnesium  2.2 mg/dL    1.9-2.7  

 

 Inr/Protime  2018  Inr  2.83  High  0.77-1.02  

 

 Comp Metabolic Panel  2018  Sodium  139 mmol/L    139-145  









 Potassium  4.3 mmol/L    3.5-5.0  

 

 Chloride  103 mmol/L    101-111  

 

 Co2 Carbon Dioxide  28 mmol/L    22-32  

 

 Anion Gap  8 mmol/L    2-11  

 

 Glucose  100 mg/dL      

 

 Blood Urea Nitrogen  29 mg/dL  High  6-24  

 

 Creatinine  1.15 mg/dL    0.67-1.17  

 

 BUN/Creatinine Ratio  25.2  High  8-20  

 

 Calcium  9.2 mg/dL    8.6-10.3  

 

 Total Protein  6.5 g/dL    6.4-8.9  

 

 Albumin  4.2 g/dL    3.2-5.2  

 

 Globulin  2.3 g/dL    2-4  

 

 Albumin/Globulin Ratio  1.8    1-3  

 

 Total Bilirubin  0.40 mg/dL    0.2-1.0  

 

 Alkaline Phosphatase  66 U/L      

 

 Alt  30 U/L    7-52  

 

 Ast  20 U/L    13-39  

 

 Egfr Non-  65.3    >60  

 

 Egfr   84.0    >60  13









 Laboratory test finding  2018  Erythrocyte Sed Rate  47 mm/Hr  High  0-
20  









 C Reactive Protein  8.51 mg/L  High  < 5.00  14









 CBC Auto Diff  2018  White Blood Count  6.2 10^3/uL    3.5-10.8  









 Red Blood Count  3.70 10^6/uL  Low  4.0-5.4  

 

 Hemoglobin  12.5 g/dL  Low  14.0-18.0  

 

 Hematocrit  36 %  Low  42-52  

 

 Mean Corpuscular Volume  98 fL  High  80-94  

 

 Mean Corpuscular Hemoglobin  34 pg  High  27-31  

 

 Mean Corpuscular HGB Conc  34 g/dL    31-36  

 

 Red Cell Distribution Width  15 %    10.5-15  

 

 Platelet Count  314 10^3/uL    150-450  

 

 Mean Platelet Volume  7.9 um3    7.4-10.4  

 

 Abs Neutrophils  4.2 10^3/uL    1.5-7.7  

 

 Abs Lymphocytes  1.3 10^3/uL    1.0-4.8  

 

 Abs Monocytes  0.6 10^3/uL    0-0.8  

 

 Abs Eosinophils  0.1 10^3/uL    0-0.6  

 

 Abs Basophils  0.1 10^3/uL    0-0.2  

 

 Abs Nucleated RBC  0 10^3/uL      

 

 Granulocyte %  67.0 %    38-83  

 

 Lymphocyte %  20.3 %  Low  25-47  

 

 Monocyte %  9.6 %  High  0-7  

 

 Eosinophil %  2.1 %    0-6  

 

 Basophil %  1.0 %    0-2  

 

 Nucleated Red Blood Cells %  0      









 Thyroid Panel  2018  Free T4 (Free Thyroxine)  0.98 ng/dL    0.61-1.12  









 Thyroxine  8.80 g/mL    6.09-12.23  

 

 TSH (Thyroid Stim Horm)  3.45 mcIU/mL    0.34-5.60  









 Laboratory test finding  2018  Creatine Kinase(CK)  199 U/L      









 LDL Cholesterol Direct  141 mg/dL      15









 Inr/Protime  2018  Inr  3.03  High  0.77-1.02  

 

 Laboratory test finding  2018  B-Type Natriuretic  124 pg/mL  High    16



     Peptide BNP        









 Troponin-I (TnI)  0.00 ng/mL    <0.04  









 Laboratory test finding  2018  Creatine Kinase(CK)  159 U/L      









 LDL Cholesterol Direct  97 mg/dL      17









 Comp Metabolic Panel  2017  Sodium  140 mmol/L    133-145  









 Potassium  4.2 mmol/L    3.5-5.0  

 

 Chloride  104 mmol/L    101-111  

 

 Co2 Carbon Dioxide  31 mmol/L    22-32  

 

 Anion Gap  5 mmol/L    2-11  

 

 Glucose  77 mg/dL      

 

 Blood Urea Nitrogen  22 mg/dL    6-24  

 

 Creatinine  1.09 mg/dL    0.67-1.17  

 

 BUN/Creatinine Ratio  20.2  High  8-20  

 

 Albumin  4.5 g/dL    3.2-5.2  

 

 Total Bilirubin  0.50 mg/dL    0.2-1.0  

 

 Alkaline Phosphatase  50 U/L      

 

 Alt  26 U/L    7-52  

 

 Ast  22 U/L    13-39  

 

 Egfr Non-  69.7    >60  

 

 Egfr   89.7    >60  18

 

 Calcium  9.5 mg/dL    8.6-10.3  

 

 Total Protein  6.7 g/dL    6.4-8.9  

 

 Globulin  2.2 g/dL    2-4  

 

 Albumin/Globulin Ratio  2.0    1-3  









 Lipid Profile (Trig/Chol/HDL)  2017  Triglycerides  268 mg/dL      19









 Cholesterol  207 mg/dL      20

 

 HDL Cholesterol  44.5 mg/dL      21

 

 LDL Cholesterol  109 mg/dL      22









 Laboratory test finding  2017  Potassium Redraw  3.8 mmol/L    3.5-5.0  









 Ast Redraw  23 U/L    13-39  









 CBC Auto Diff  2017  White Blood Count  12.1 10^3/uL  High  3.5-10.8  









 Red Blood Count  4.09 10^6/uL    4.0-5.4  

 

 Hemoglobin  13.8 g/dL  Low  14.0-18.0  

 

 Hematocrit  41 %  Low  42-52  

 

 Mean Corpuscular Volume  100 fL  High  80-94  

 

 Mean Corpuscular Hemoglobin  34 pg  High  27-31  

 

 Mean Corpuscular HGB Conc  34 g/dL    31-36  

 

 Red Cell Distribution Width  14 %    10.5-15  

 

 Platelet Count  204 10^3/uL    150-450  

 

 Mean Platelet Volume  9 um3    7.4-10.4  

 

 Abs Neutrophils  10.3 10^3/uL  High  1.5-7.7  

 

 Abs Lymphocytes  0.8 10^3/uL  Low  1.0-4.8  

 

 Abs Monocytes  0.9 10^3/uL  High  0-0.8  

 

 Abs Eosinophils  0 10^3/uL    0-0.6  

 

 Abs Basophils  0 10^3/uL    0-0.2  

 

 Abs Nucleated RBC  0 10^3/uL      

 

 Granulocyte %  85.6 %  High  38-83  

 

 Lymphocyte %  6.3 %  Low  25-47  

 

 Monocyte %  7.8 %    1-9  

 

 Eosinophil %  0.1 %    0-6  

 

 Basophil %  0.2 %    0-2  

 

 Nucleated Red Blood Cells %  0      









 Urinalysis Profile  2017  Urine Color  Yellow      









 Urine Appearance  Clear      

 

 Urine Specific Gravity  1.017    1.010-1.030  

 

 Urine pH  6.0    5-9  

 

 Urine Urobilinogen  Negative    Negative  

 

 Urine Ketones  Trace    Negative  

 

 Urine Protein  Negative    Negative  

 

 Urine Leukocytes  Negative    Negative  

 

 Urine Blood  1+    Negative  

 

 *  *    Negative  23

 

 Urine Nitrite  Negative    Negative  

 

 Urine Bilirubin  Negative    Negative  

 

 Urine Glucose  1+(50 mg/dL)    Negative  

 

 Urine White Blood Cell  Absent    Absent  

 

 Urine Red Blood Cell  1+(3-5/hpf)    Absent  

 

 Urine Bacteria  Absent    Absent  









 Comp Metabolic Panel  2017  Sodium  136 mmol/L    133-145  









 Chloride  100 mmol/L  Low  101-111  

 

 Co2 Carbon Dioxide  28 mmol/L    22-32  

 

 Glucose  153 mg/dL  High    

 

 Blood Urea Nitrogen  31 mg/dL  High  6-24  

 

 Creatinine  1.83 mg/dL  High  0.67-1.17  

 

 BUN/Creatinine Ratio  16.9    8-20  

 

 Calcium  9.5 mg/dL    8.6-10.3  

 

 Total Protein  7.2 g/dL    6.4-8.9  

 

 Albumin  4.1 g/dL    3.2-5.2  

 

 Globulin  3.1 g/dL    2-4  

 

 Albumin/Globulin Ratio  1.3    1-3  

 

 Total Bilirubin  0.70 mg/dL    0.2-1.0  

 

 Alkaline Phosphatase  44 U/L      

 

 Alt  23 U/L    7-52  

 

 Egfr Non-  38.3    >60  

 

 Egfr   49.3    >60  24

 

 Potassium  TNP mmol/L    3.5-5.0  25

 

 Anion Gap  8 mmol/L    2-11  

 

 Ast  TNP U/L    13-39  26









 Urinalysis Profile  2017  Urine Color  Yellow      









 Urine Appearance  Cloudy      

 

 Urine Specific Gravity  1.017    1.010-1.030  

 

 Urine pH  5.0    5-9  

 

 Urine Urobilinogen  Negative    Negative  

 

 Urine Ketones  1+    Negative  

 

 Urine Protein  Negative    Negative  

 

 Urine Leukocytes  Negative    Negative  

 

 Urine Blood  3+    Negative  

 

 Urine Nitrite  Negative    Negative  

 

 Urine Bilirubin  Negative    Negative  

 

 Urine Glucose  1+(50 mg/dL)    Negative  

 

 Urine White Blood Cell  Absent    Absent  

 

 Urine Red Blood Cell  3+(>10/hpf)    Absent  

 

 Urine Bacteria  Absent    Absent  









 CBC Auto Diff  2017  White Blood Count  9.1 10^3/uL    3.5-10.8  









 Red Blood Count  4.36 10^6/uL    4.0-5.4  

 

 Hemoglobin  14.6 g/dL    14.0-18.0  

 

 Hematocrit  45 %    42-52  

 

 Mean Corpuscular Volume  102 fL  High  80-94  

 

 Mean Corpuscular Hemoglobin  34 pg  High  27-31  

 

 Mean Corpuscular HGB Conc  33 g/dL    31-36  

 

 Red Cell Distribution Width  14 %    10.5-15  

 

 Platelet Count  189 10^3/uL    150-450  

 

 Mean Platelet Volume  8 um3    7.4-10.4  

 

 Abs Neutrophils  7.9 10^3/uL  High  1.5-7.7  

 

 Abs Lymphocytes  0.8 10^3/uL  Low  1.0-4.8  

 

 Abs Monocytes  0.3 10^3/uL    0-0.8  

 

 Abs Eosinophils  0 10^3/uL    0-0.6  

 

 Abs Basophils  0 10^3/uL    0-0.2  

 

 Abs Nucleated RBC  0.01 10^3/uL      

 

 Granulocyte %  87.1 %  High  38-83  

 

 Lymphocyte %  8.7 %  Low  25-47  

 

 Monocyte %  3.7 %    1-9  

 

 Eosinophil %  0.1 %    0-6  

 

 Basophil %  0.4 %    0-2  

 

 Nucleated Red Blood Cells %  0.1      









 Comp Metabolic Panel  2017  Sodium  140 mmol/L    133-145  









 Potassium  3.9 mmol/L    3.5-5.0  

 

 Chloride  104 mmol/L    101-111  

 

 Co2 Carbon Dioxide  25 mmol/L    22-32  

 

 Anion Gap  11 mmol/L    2-11  

 

 Glucose  172 mg/dL  High    

 

 Blood Urea Nitrogen  27 mg/dL  High  6-24  

 

 Creatinine  1.37 mg/dL  High  0.67-1.17  

 

 BUN/Creatinine Ratio  19.7    8-20  

 

 Calcium  9.5 mg/dL    8.6-10.3  

 

 Total Protein  7.1 g/dL    6.4-8.9  

 

 Albumin  4.4 g/dL    3.2-5.2  

 

 Globulin  2.7 g/dL    2-4  

 

 Albumin/Globulin Ratio  1.6    1-3  

 

 Total Bilirubin  0.70 mg/dL    0.2-1.0  

 

 Alkaline Phosphatase  41 U/L      

 

 Alt  34 U/L    7-52  

 

 Ast  32 U/L    13-39  

 

 Egfr Non-  53.6    >60  

 

 Egfr   68.9    >60  27









 Laboratory test finding  2017  Lipase  24 U/L    11.0-82.0  









 CRP High Sensitivity  1.65 mg/L      28









 Laboratory test finding  2017  Lyme Disease Serology  Negative    
Negative  29









 Rheumatoid Factor  <15 IU/mL    <15  30

 

 Erythrocyte Sed Rate  13 mm/Hr    0-20  









 Connective Tissue Panel  2017  Anti-Nuclear Antibody  0.4 U      31









 Cyclic Citrullinated Peptide  <15.6 U      32

 

 Interpretation  See Comment      33









 Comp Metabolic Panel  2017  Sodium  139 mmol/L    133-145  









 Potassium  3.8 mmol/L    3.5-5.0  

 

 Chloride  102 mmol/L    101-111  

 

 Co2 Carbon Dioxide  30 mmol/L    22-32  

 

 Anion Gap  7 mmol/L    2-11  

 

 Glucose  109 mg/dL  High    

 

 Blood Urea Nitrogen  23 mg/dL    6-24  

 

 Creatinine  1.04 mg/dL    0.67-1.17  

 

 BUN/Creatinine Ratio  22.1  High  8-20  

 

 Calcium  9.6 mg/dL    8.6-10.3  

 

 Total Protein  6.7 g/dL    6.4-8.9  

 

 Albumin  4.5 g/dL    3.2-5.2  

 

 Globulin  2.2 g/dL    2-4  

 

 Albumin/Globulin Ratio  2.0    1-3  

 

 Total Bilirubin  0.50 mg/dL    0.2-1.0  

 

 Alkaline Phosphatase  43 U/L      

 

 Alt  28 U/L    7-52  

 

 Ast  22 U/L    13-39  

 

 Egfr Non-  73.6    >60  

 

 Egfr   94.7    >60  34









 Laboratory test finding  2017  Uric Acid  5.8 mg/dL    4.4-7.6  









 Creatine Kinase(CK)  249 U/L  High    









 Lipid Profile (Trig/Chol/HDL)  2017  Triglycerides  186 mg/dL      35









 Cholesterol  152 mg/dL      36

 

 HDL Cholesterol  43.3 mg/dL      37

 

 LDL Cholesterol  72 mg/dL      38









 Laboratory test finding  2017  Ast (Sgot)  21 U/L    13-39  

 

 Basic Metabolic Panel  2017  Sodium  140 mmol/L    133-145  









 Potassium  4.2 mmol/L    3.5-5.0  

 

 Chloride  102 mmol/L    101-111  

 

 Co2 Carbon Dioxide  32 mmol/L    22-32  

 

 Anion Gap  6 mmol/L    2-11  

 

 Glucose  105 mg/dL  High    

 

 Blood Urea Nitrogen  26 mg/dL  High  6-24  

 

 Creatinine  1.10 mg/dL    0.67-1.17  

 

 BUN/Creatinine Ratio  23.6  High  8-20  

 

 Calcium  9.6 mg/dL    8.6-10.3  

 

 Egfr Non-  69.0    >60  

 

 Egfr   88.7    >60  39









 Comp Metabolic Panel  2016  Sodium  141 mmol/L    133-145  









 Potassium  3.8 mmol/L    3.5-5.0  

 

 Chloride  105 mmol/L    101-111  

 

 Co2 Carbon Dioxide  30 mmol/L    22-32  

 

 Anion Gap  6 mmol/L    2-11  

 

 Glucose  107 mg/dL  High    

 

 Blood Urea Nitrogen  29 mg/dL  High  6-24  

 

 Creatinine  1.09 mg/dL    0.67-1.17  

 

 BUN/Creatinine Ratio  26.6  High  8-20  

 

 Calcium  9.4 mg/dL    8.6-10.3  

 

 Total Protein  6.5 g/dL    6.4-8.9  

 

 Albumin  4.5 g/dL    3.2-5.2  

 

 Globulin  2.0 g/dL    2-4  

 

 Albumin/Globulin Ratio  2.3    1-3  

 

 Total Bilirubin  0.60 mg/dL    0.2-1.0  

 

 Alkaline Phosphatase  42 U/L      

 

 Alt  23 U/L    7-52  

 

 Ast  21 U/L    13-39  

 

 Egfr Non-  70.0    >60  

 

 Egfr   90.0    >60  40









 Lipid Profile (Trig/Chol/HDL)  2016  Triglycerides  152 mg/dL      41









 Cholesterol  131 mg/dL      42

 

 HDL Cholesterol  40.0 mg/dL      43

 

 LDL Cholesterol  61 mg/dL      44









 Lipid Profile (Trig/Chol/HDL)  2016  Triglycerides  198 mg/dL      45









 Cholesterol  198 mg/dL      46

 

 HDL Cholesterol  38.9 mg/dL      47

 

 LDL Cholesterol  120 mg/dL      48









 Comp Metabolic Panel  2016  Sodium  139 mmol/L    133-145  









 Potassium  3.7 mmol/L    3.5-5.0  

 

 Chloride  103 mmol/L    101-111  

 

 Co2 Carbon Dioxide  31 mmol/L    22-32  

 

 Anion Gap  5 mmol/L    2-11  

 

 Glucose  114 mg/dL  High    

 

 Blood Urea Nitrogen  25 mg/dL  High  6-24  

 

 Creatinine  0.98 mg/dL    0.67-1.17  

 

 BUN/Creatinine Ratio  25.5  High  8-20  

 

 Calcium  9.1 mg/dL    8.6-10.3  

 

 Total Protein  6.5 g/dL    6.4-8.9  

 

 Albumin  4.4 g/dL    3.2-5.2  

 

 Globulin  2.1 g/dL    2-4  

 

 Albumin/Globulin Ratio  2.1    1-3  

 

 Total Bilirubin  0.70 mg/dL    0.2-1.0  

 

 Alkaline Phosphatase  41 U/L      

 

 Alt  32 U/L    7-52  

 

 Ast  26 U/L    13-39  

 

 Egfr Non-  79.1    >60  

 

 Egfr   101.8    >60  49









 Laboratory test finding  2016  Creatine Kinase(CK)  190 U/L      50

 

 Laboratory test finding  2015  Hemoglobin A1c  5.8    5-7  

 

 Laboratory test finding  2015  TSH (Thyroid Stim Horm)  1.70 ?IU/mL    
0.34-5.60  

 

 Comp Metabolic Panel  2015  Sodium  141 mmol/L    133-145  









 Potassium  4.0 mmol/L    3.5-5.0  

 

 Chloride  105 mmol/L    101-111  

 

 Co2 Carbon Dioxide  28 mmol/L    22-32  

 

 Anion Gap  8 mmol/L    2-11  

 

 Glucose  95 mg/dL      

 

 Blood Urea Nitrogen  20 mg/dL    6-24  

 

 Creatinine  0.91 mg/dL    0.67-1.17  

 

 BUN/Creatinine Ratio  22.0  High  8-20  

 

 Calcium  9.5 mg/dL    8.6-10.3  

 

 Total Protein  6.7 g/dL    6.4-8.9  

 

 Albumin  4.6 g/dL    3.2-5.2  

 

 Globulin  2.1 g/dL    2-4  

 

 Albumin/Globulin Ratio  2.2    1-3  

 

 Total Bilirubin  0.60 mg/dL    0.2-1.0  

 

 Alkaline Phosphatase  47 U/L      

 

 Alt  28 U/L    7-52  

 

 Ast  24 U/L    13-39  

 

 Egfr Non-  86.2    >60  

 

 Egfr   110.8    >60  51









 Laboratory test finding  2015  Creatine Kinase(CK)  148 U/L      

 

 Lipid Profile (Trig/Chol/HDL)  2015  Triglycerides  222 mg/dL      52









 Cholesterol  157 mg/dL      53

 

 HDL Cholesterol  37.9 mg/dL      54

 

 LDL Cholesterol  75 mg/dL      55









 Manual Differential  2015  Immature Granulocytes  4 %    0-9  









 Neutrophil %  49 %    38-83  

 

 Band %  4 %    0-8  

 

 Lymphocytes %  26 %    25-47  

 

 Monocytes %  14 %  High  0-13  

 

 Basophil %  2 %    0-2  

 

 Reactive Lymph %  5 %    0-6  

 

 Toxic Granulation  2+      

 

 Macrocytosis  1+      









 Laboratory test finding  2015  Lipase  31 U/L    11.0-82.0  









 C Reactive Protein  138.29 mg/L  High  < 5.00  56









 CBC Auto Diff  2015  White Blood Count  4.1 10^3/uL  Low  4.8-10.8  









 Red Blood Count  4.60 10^6/uL    4.0-5.4  

 

 Hemoglobin  15.3 g/dL    14.0-18.0  

 

 Hematocrit  45 %    42-52  

 

 Mean Corpuscular Volume  99 fL  High  80-94  

 

 Mean Corpuscular Hemoglobin  33 pg  High  27-31  

 

 Mean Corpuscular HGB Conc  34 g/dL    31-36  

 

 Red Cell Distribution Width  13 %    10.5-15  

 

 Platelet Count  205 10^3/uL    150-450  

 

 Mean Platelet Volume  9 um3    7.4-10.4  

 

 Abs Neutrophils  2.4 10^3/uL    1.5-7.7  

 

 Abs Lymphocytes  0.7 10^3/uL  Low  1.0-4.8  

 

 Abs Monocytes  1.0 10^3/uL  High  0-0.8  

 

 Abs Eosinophils  0 10^3/uL    0-0.6  

 

 Abs Basophils  0 10^3/uL    0-0.2  

 

 Abs Nucleated RBC  0 10^3/uL      









 Comp Metabolic Panel  2015  Sodium  138 mmol/L    133-145  









 Potassium  3.2 mmol/L  Low  3.5-5.0  

 

 Chloride  98 mmol/L  Low  101-111  

 

 Co2 Carbon Dioxide  34 mmol/L  High  22-32  

 

 Anion Gap  6 mmol/L    2-11  

 

 Glucose  127 mg/dL  High    

 

 Blood Urea Nitrogen  16 mg/dL    6-24  

 

 Creatinine  0.99 mg/dL    0.67-1.17  

 

 BUN/Creatinine Ratio  16.2    8-20  

 

 Calcium  9.9 mg/dL    8.6-10.3  

 

 Total Protein  7.3 g/dL    6.4-8.9  

 

 Albumin  4.5 g/dL    3.2-5.2  

 

 Globulin  2.8 g/dL    2-4  

 

 Albumin/Globulin Ratio  1.6    1-3  

 

 Total Bilirubin  0.60 mg/dL    0.2-1.0  

 

 Alkaline Phosphatase  41 U/L      

 

 Alt  23 U/L    7-52  

 

 Ast  19 U/L    13-39  

 

 Egfr Non-  78.5    >60  

 

 Egfr   100.9    >60  57









 Laboratory test finding  2015  Activated Partial  32.6 seconds    24.0-
36.1  



     Thrombo Time        









 Lactic Acid  1.1 mmol/L    0.5-2.2  









 Inr/Protime  2015  Inr  1.12  High  0.78-1.07  58

 

 Urinalysis Profile  2015  Urine Color  Yellow      









 Urine Appearance  Clear      

 

 Urine Specific Gravity  1.017    1.010-1.030  

 

 Urine pH  6.0    5-9  

 

 Urine Urobilinogen  Negative    Negative  

 

 Urine Ketones  1+    Negative  

 

 Urine Protein  Negative    Negative  

 

 Urine Leukocytes  Negative    Negative  

 

 Urine Blood  Negative    Negative  

 

 Urine Nitrite  Negative    Negative  

 

 Urine Bilirubin  Negative    Negative  

 

 Urine Glucose  1+(50 mg/dL)    Negative  









 Urinalysis Profile  2015  Urine Color  Liz      









 Urine Appearance  Cloudy      

 

 Urine Specific Gravity  1.031  High  1.010-1.030  

 

 Urine pH  5.0    5-9  

 

 Urine Urobilinogen  Negative    Negative  

 

 Urine Ketones  Negative    Negative  

 

 Urine Protein  1+(30 mg/dL)    Negative  

 

 Urine Leukocytes  Negative    Negative  

 

 Urine Blood  Negative    Negative  

 

 Urine Nitrite  Negative    Negative  

 

 Urine Bilirubin  Negative    Negative  

 

 Urine Glucose  Negative    Negative  

 

 Urine White Blood Cell  Trace(0-5/hpf)    Absent  

 

 Urine Red Blood Cell  Trace(0-2/hpf)    Absent  

 

 Urine Bacteria  Absent    Absent  

 

 Urine Hyaline Casts  Present    Absent  









 CBC Auto Diff  2015  White Blood Count  6.9 10^3/uL    4.8-10.8  









 Red Blood Count  4.62 10^6/uL    4.0-5.4  

 

 Hemoglobin  15.6 g/dL    14.0-18.0  

 

 Hematocrit  46 %    42-52  

 

 Mean Corpuscular Volume  99 fL  High  80-94  

 

 Mean Corpuscular Hemoglobin  34 pg  High  27-31  

 

 Mean Corpuscular HGB Conc  34 g/dL    31-36  

 

 Red Cell Distribution Width  14 %    10.5-15  

 

 Platelet Count  200 10^3/uL    150-450  

 

 Mean Platelet Volume  8 um3    7.4-10.4  

 

 Abs Neutrophils  5.4 10^3/uL    1.5-7.7  

 

 Abs Lymphocytes  0.7 10^3/uL  Low  1.0-4.8  

 

 Abs Monocytes  0.6 10^3/uL    0-0.8  

 

 Abs Eosinophils  0 10^3/uL    0-0.6  

 

 Abs Basophils  0 10^3/uL    0-0.2  

 

 Abs Nucleated RBC  0.01 10^3/uL      

 

 Granulocyte %  79.4 %    38-83  

 

 Lymphocyte %  10.7 %  Low  25-47  

 

 Monocyte %  9.2 %  High  1-9  

 

 Eosinophil %  0.3 %    0-6  

 

 Basophil %  0.4 %    0-2  

 

 Nucleated Red Blood Cells %  0.2      









 Laboratory test finding  2015  Lactic Acid  1.4 mmol/L    0.5-2.2  

 

 Comp Metabolic Panel  2015  Sodium  138 mmol/L    133-145  









 Potassium  3.8 mmol/L    3.5-5.0  

 

 Chloride  103 mmol/L    101-111  

 

 Co2 Carbon Dioxide  27 mmol/L    22-32  

 

 Anion Gap  8 mmol/L    2-11  

 

 Glucose  151 mg/dL  High    

 

 Blood Urea Nitrogen  41 mg/dL  High  6-24  

 

 Creatinine  1.48 mg/dL  High  0.67-1.17  

 

 BUN/Creatinine Ratio  27.7  High  8-20  

 

 Calcium  9.0 mg/dL    8.6-10.3  

 

 Total Protein  7.1 g/dL    6.4-8.9  

 

 Albumin  4.6 g/dL    3.2-5.2  

 

 Globulin  2.5 g/dL    2-4  

 

 Albumin/Globulin Ratio  1.8    1-3  

 

 Total Bilirubin  0.50 mg/dL    0.2-1.0  

 

 Alkaline Phosphatase  44 U/L      

 

 Alt  29 U/L    7-52  

 

 Ast  25 U/L    13-39  

 

 Egfr Non-  49.3    >60  

 

 Egfr   63.5    >60  59









 Laboratory test finding  2015  Magnesium  2.3 mg/dL    1.9-2.7  









 Lipase  16 U/L    11.0-82.0  

 

 Troponin I  0.01 ng/mL    <0.03  60

 

 TSH (Thyroid Stimulating Horm)  2.22 IU/mL    0.34-5.60  

 

 C Reactive Protein  96.82 mg/L  High  < 5.00  61









 Laboratory test finding  2014  Creatine Kinase  194 U/L      









 LDL Cholesterol Direct  114 mg/dL      62









 Lipid Profile (Trig/Chol/HDL)  07/10/2014  Triglycerides  143 mg/dL      63









 Cholesterol  136 mg/dL      64

 

 HDL Cholesterol  42.2 mg/dL      65

 

 LDL Cholesterol  65 mg/dL      66









 Laboratory test finding  07/10/2014  Creatine Kinase  308 U/L  High    









 Ast  22 U/L    13-39  









 CBC Auto Diff  2014  White Blood Count  9.3 10^3/uL    4.8-10.8  









 Red Blood Count  4.43 10^6/uL    4.0-5.4  

 

 Hemoglobin  14.3 g/dL    14.0-18.0  

 

 Hematocrit  43 %    42-52  

 

 Mean Corpuscular Volume  98 fL  High  80-94  

 

 Mean Corpuscular Hemoglobin  32 pg  High  27-31  

 

 Mean Corpuscular HGB Conc  33 g/dL    31-36  

 

 Red Cell Distribution Width  13 %    10.5-15  

 

 Platelet Count  220 10^3/uL    150-450  

 

 Mean Platelet Volume  8 um3    7.4-10.4  

 

 Abs Neutrophils  7.8 10^3/uL  High  1.5-7.7  

 

 Abs Lymphocytes  1.1 10^3/uL    1.0-4.8  

 

 Abs Monocytes  0.3 10^3/uL    0-0.8  

 

 Abs Eosinophils  0 10^3/uL    0-0.6  

 

 Abs Basophils  0 10^3/uL    0-0.2  

 

 Abs Nucleated RBC  0 10^3/uL      

 

 Granulocyte %  84.7 %  High  38-83  

 

 Lymphocyte %  11.7 %  Low  25-47  

 

 Monocyte %  3.3 %    1-9  

 

 Eosinophil %  0.1 %    0-6  

 

 Basophil %  0.2 %    0-2  

 

 Nucleated Red Blood Cells %  0      









 Inr/Protime  2014  Inr  0.94    0.85-1.06  

 

 Laboratory test  2014  D Dimer Quantitative  < 200 ng/mL    Less Than 
230  67



 finding            

 

 Comp Metabolic Panel  2014  Sodium  137 mmol/L    133-145  









 Potassium  4.0 mmol/L    3.5-5.0  

 

 Chloride  97 mmol/L  Low  101-111  

 

 Co2 Carbon Dioxide  34.0 mmol/L  High  22-32  

 

 Anion Gap  6.0 mmol/L    2-11  

 

 Glucose  128 mg/dL  High    

 

 Blood Urea Nitrogen  24 mg/dL    6-24  

 

 Creatinine  1.00 mg/dL    0.50-1.40  

 

 BUN/Creatinine Ratio  24.0  High  8-20  

 

 Calcium  10.2 mg/dL  High  8.1-9.9  

 

 Total Protein  7.5 g/dL    6.2-8.1  

 

 Albumin  4.5 g/dL    3.6-5.4  

 

 Globulin  3.0 g/dL    2-4  

 

 Albumin/Globulin Ratio  1.5    1-3  

 

 Total Bilirubin  0.6 mg/dL    0.4-1.5  

 

 Alkaline Phosphatase  43 U/L      

 

 Alt  40 U/L    14-54  

 

 Ast  43 U/L  High  12-42  

 

 Egfr Non-  77.9    >60  

 

 Egfr   100.1    >60  68









 Laboratory test finding  2014  Magnesium  2.2 mg/dL    1.7-2.6  









 Creatine Kinase  542 U/L  High  0-200  









 CKMB  2014  CKMB  ng/mL  33.9 ng/mL  High  0.3-4.0  69

 

 Laboratory test finding  2014  Troponin I  0.56 ng/mL  High  0-0.06  70









 Activated Partial Thrombo Time  33.8 seconds    24.0-36.1  









 Lipid Profile (Trig/Chol/HDL)  2013  Triglycerides  141 mg/dL      









 Cholesterol  211 mg/dL  High  Less than 200  

 

 HDL Cholesterol  45 mg/dL    40-60  71

 

 Cholesterol/HDL Ratio  4.7 Average  High  1-4.44  

 

 LDL Cholesterol  137.8  High  Less Than 100  72









 Lipid Profile (Trig/Chol/HDL)  2012  Triglycerides  185 mg/dL      









 Cholesterol  237 mg/dL  High  Less than 200  

 

 HDL Cholesterol  44 mg/dL    40-60  73

 

 Cholesterol/HDL Ratio  5.4 Average  High  1-4.44  

 

 LDL Cholesterol  156.0 mg/dL  High  Less Than 100  74









 Comp Metabolic Panel  2012  Sodium  141 mmol/L    133-145  









 Potassium  4.0 mmol/L    3.5-5.0  

 

 Chloride  106 mmol/L    101-111  

 

 Co2 Carbon Dioxide  31.0 mmol/L    22-32  

 

 Anion Gap  4.0 mmol/L    2-11  

 

 Glucose  110 mg/dL  High    

 

 Blood Urea Nitrogen  18 mg/dL    6-24  

 

 Creatinine  1.10 mg/dL    0.50-1.40  

 

 BUN/Creatinine Ratio  16.4    8-20  

 

 Calcium  9.4 mg/dL    8.1-9.9  

 

 Total Protein  6.7 g/dL    6.2-8.1  

 

 Albumin  4.4 g/dL    3.6-5.4  

 

 Globulin  2.3 g/dL    2-4  

 

 Albumin/Globulin Ratio  1.9    1-3  

 

 Total Bilirubin  1.1 mg/dL    0.4-1.5  

 

 Alkaline Phosphatase  45 U/L      

 

 Alt  29 U/L    14-54  

 

 Ast  25 U/L    12-42  

 

 Egfr Non-  70.0    >60  

 

 Egfr   90.1    >60  75









 Laboratory test finding  2012  Jane (Anti-Nuclear AB)  Negative    
Negative  



     Screen        









 Erythrocyte Sed Rate  12 mm/Hr    0-20  

 

 Rheumatoid Factor  <15 IU/mL    <15  76

 

 Uric Acid  6.6 mg/dL    2.6-7.2  

 

 Lyme Disease Serology  Negative    Negative  77









 CBC Auto Diff  2011  White Blood Count  4.5 CUMM  Low  4.8-10.8  









 Red Cell Count  4.40 CUMM  Low  4.6-6.2  

 

 Hemoglobin  14.7 g/dL    14.0-18.0  

 

 Hematocrit  43 %    42-52  

 

 Mean Corpuscular Volume  97 um3  High  80-94  

 

 Mean Corpuscular Hemoglob  33 pg  High  27-31  

 

 Mean Corpuscular HGB Cone  34 g/dL    32-36  

 

 Redcell Distribution WDTH  13 %    10.5-15  

 

 Platelet Count  185 CUMM    150-450  

 

 Mean Platelet Volume  8.8 um3    7.4-10.4  

 

 Gran %  69.3 %    38-83  

 

 Lymph %  19.2 %  Low  25-47  

 

 Mononuclear %  10.0 %  High  1-9  

 

 Eosinophil %  1.3 %    0-6  

 

 Basophil %  0.2 %    0-2  

 

 Abs Lymphs  0.9  Low  1.0-4.8  

 

 Abs Mononuclear  0.5    0-0.8  

 

 Absolute Neutrophil Count  3.1    1.5-7.7  

 

 Abs Eosinophils  0.1    0-0.6  

 

 Abs Basophils  0    0-0.2  78









 Comp Metabolic Panel  2011  Sodium  140 mmol/L    135-145  









 Potassium  3.8 mmol/L    3.5-5.0  

 

 Chloride  104 mmol/L    101-111  

 

 Co2 (Carbon Dioxide)  27.0 mmol/L    22-32  

 

 Anion Gap  9.0 mmol/L    2-11  79

 

 Glucose  106 mg/dL  High    

 

 BUN  21 mg/dL    6-24  

 

 Creatinine  1.0 mg/dL    0.50-1.40  

 

 One Over Creatinine  1.00      

 

 BUN/Creatinine Ratio  21.0  High  8-20  

 

 Calcium  8.8 mg/dL    8.1-9.9  

 

 Total Protein  6.6 GM/DL    6.2-8.1  

 

 Albumin  4.2 GM/DL    3.6-5.4  

 

 Globulin  2.4 GM/DL    2-4  

 

 Albumin/Globulin Ratio  1.8    1-3  

 

 Bilirubin Total  0.7 mg/dL    0.4-1.5  80

 

 Alkaline Phosphatase  54 U/L      

 

 Alt (SGPT)  33 U/L    17-63  

 

 Ast (Sgot)  26 U/L    12-42  

 

 eGFR Non-  78.5    > 60  

 

 eGFR   100.9    > 60  81









 Laboratory test finding  2011  TSH  1.60 MIU/ML    0.34-5.60  

 

 Lipid Profile (Trig/Chol/HDL)  2011  Triglyceride  149 mg/dL      









 Cholesterol  194 mg/dL    Less Than 200  82

 

 High Density Lipoprotein  40 mg/dL    40-60  83

 

 Cholesterol/HDL Ratio  4.85 AVERAGE    1-4.97  

 

 Low Density Lipoprotein  124 mg/dL  High  Less Than 100  84









 1  *******Because ethnic data is not always readily available,



   this report includes an eGFR for both -Americans and



   non- Americans.****



   The National Kidney Disease Education Program (NKDEP) does



   not endorse the use of the MDRD equation for patients that



   are not between the ages of 18 and 70, are pregnant, have



   extremes of body size, muscle mass, or nutritional status,



   or are non- or non-.



   According to the National Kidney Foundation, irrespective of



   diagnosis, the stage of the disease is based on the level of



   kidney function:



   Stage Description                      GFR(mL/min/1.73 m(2))



   1     Kidney damage with normal or decreased GFR       90



   2     Kidney damage with mild decrease in GFR          60-89



   3     Moderate decrease in GFR                         30-59



   4     Severe decrease in GFR                           15-29



   5     Kidney failure                       <15 (or dialysis)

 

 2  [id number]

 

 3  *Ascorbic acid is present which may interfere with detection



   of blood.

 

 4  SEE RESULT BELOW



   -----------------------------------------------------------------------------
---------------



   Name:  CRISTI FRIED                  : 1960    Attend Dr: Jimbo Rasheed MD



   Acct:  [id number]  Unit: [id number]  AGE: 58            Location:  [facility]



   Re18                        SEX: M             Status:    REG REF



   -----------------------------------------------------------------------------
---------------



   



   SPEC: [id number]         MARIANNE:       SUBM DR: Jimbo Rasheed MD



   REQ:  [id number]              RECD:   



   STATUS: COMP



   _



   SOURCE: URINE          SPDESC:



   ORDERED:  Urine Culture



   



   -----------------------------------------------------------------------------
---------------



   Procedure                         Result                         Reported   
        Site



   -----------------------------------------------------------------------------
---------------



   Urine Culture  Final                                             18-
[id number]      ML



   No Growth (<1,000 CFU/mL)



   



   -----------------------------------------------------------------------------
---------------



   * ML - Main Lab



   .



   



   



   



   



   



   



   



   



   



   



   



   



   



   



   



   



   



   



   



   



   



   



   



   



   



   



   



   ** END OF REPORT **



   



   DEPARTMENT OF PATHOLOGY,  [address] [place] [address]



   Phone # [phone]      Fax #[phone]



   Dawit Villanueva M.D. Director     [facility] # [id number]

 

 5  *******Because ethnic data is not always readily available,



   this report includes an eGFR for both -Americans and



   non- Americans.****



   The National Kidney Disease Education Program (NKDEP) does



   not endorse the use of the MDRD equation for patients that



   are not between the ages of 18 and 70, are pregnant, have



   extremes of body size, muscle mass, or nutritional status,



   or are non- or non-.



   According to the National Kidney Foundation, irrespective of



   diagnosis, the stage of the disease is based on the level of



   kidney function:



   Stage Description                      GFR(mL/min/1.73 m(2))



   1     Kidney damage with normal or decreased GFR       90



   2     Kidney damage with mild decrease in GFR          60-89



   3     Moderate decrease in GFR                         30-59



   4     Severe decrease in GFR                           15-29



   5     Kidney failure                       <15 (or dialysis)

 

 6  Unable to calculate due to insufficient protein

 

 7  -------------------REFERENCE VALUE--------------------------



   (Peripheral vein specimen)



   Na-deplete, upright:



   Mean: 5.9



   Range: 2.9-10.8



   Na-replete, upright:



   Mean: 1.0



   Range: < or=0.6-3.0



   -------------------ADDITIONAL INFORMATION-------------------



   Testing performed by Liquid Chromatography-Tandem Mass



   Spectrometry (LC-MS/MS).



   This test was developed and its performance characteristics



   determined by [facility] in a manner consistent with CLIA



   requirements. This test has not been cleared or approved by



   the U.S. Food and Drug Administration.



   Test Performed by:



   [facility]



   [address] [place] [address]

 

 8  -------------------ADDITIONAL INFORMATION-------------------



   Reference range for patients 11 years and older is based on



   upright A.M. collection from subjects without sodium



   restrictions.



   This test was developed and its performance characteristics



   determined by [facility] in a manner consistent with CLIA



   requirements. This test has not been cleared or approved by



   the U.S. Food and Drug Administration.



   Test Performed by:



   [facility]



   [address] [place] [address]

 

 9  -------------------REFERENCE VALUE--------------------------



   <750 (Diet-Dependent)



   -------------------ADDITIONAL INFORMATION-------------------



   This test has been modified from the 's



   instructions. Its performance characteristics were



   determined by [facility] in a manner consistent with



   CLIA requirements. This test has not been cleared or



   approved by the U.S. Food and Drug Administration.



   The reference value is for a 24-hour collection. Specimens



   collected for other than a 24-hour time period are reported



   in unit of mg/dL for which reference values are not



   established.

 

 10  REVISED RESULTS



   -------------------PREVIOUSLY REPORTED AS-------------------



   18 (Reported 2018 12:25)

 

 11  Test Performed by:



   [facility] - [address] [address]

 

 12  *******Because ethnic data is not always readily available,



   this report includes an eGFR for both -Americans and



   non- Americans.****



   The National Kidney Disease Education Program (NKDEP) does



   not endorse the use of the MDRD equation for patients that



   are not between the ages of 18 and 70, are pregnant, have



   extremes of body size, muscle mass, or nutritional status,



   or are non- or non-.



   According to the National Kidney Foundation, irrespective of



   diagnosis, the stage of the disease is based on the level of



   kidney function:



   Stage Description                      GFR(mL/min/1.73 m(2))



   1     Kidney damage with normal or decreased GFR       90



   2     Kidney damage with mild decrease in GFR          60-89



   3     Moderate decrease in GFR                         30-59



   4     Severe decrease in GFR                           15-29



   5     Kidney failure                       <15 (or dialysis)

 

 13  *******Because ethnic data is not always readily available,



   this report includes an eGFR for both -Americans and



   non- Americans.****



   The National Kidney Disease Education Program (NKDEP) does



   not endorse the use of the MDRD equation for patients that



   are not between the ages of 18 and 70, are pregnant, have



   extremes of body size, muscle mass, or nutritional status,



   or are non- or non-.



   According to the National Kidney Foundation, irrespective of



   diagnosis, the stage of the disease is based on the level of



   kidney function:



   Stage Description                      GFR(mL/min/1.73 m(2))



   1     Kidney damage with normal or decreased GFR       90



   2     Kidney damage with mild decrease in GFR          60-89



   3     Moderate decrease in GFR                         30-59



   4     Severe decrease in GFR                           15-29



   5     Kidney failure                       <15 (or dialysis)

 

 14  Acute inflammation:  >10.00

 

 15  Desirable: <100



   Near Optimal: 100-129



   Borderline High: 130-159



   High: 160-189



   Very High: >189

 

 16  >100 to <200 pg/mL: likely compensated congestive heart



   failure (CHF)



   200 to 400 pg/mL: likely moderate CHF



   >400 pg/mL: likely moderate to severe CHF

 

 17  Desirable: <100



   Near Optimal: 100-129



   Borderline High: 130-159



   High: 160-189



   Very High: >189

 

 18  *******Because ethnic data is not always readily available,



   this report includes an eGFR for both -Americans and



   non- Americans.****



   The National Kidney Disease Education Program (NKDEP) does



   not endorse the use of the MDRD equation for patients that



   are not between the ages of 18 and 70, are pregnant, have



   extremes of body size, muscle mass, or nutritional status,



   or are non- or non-.



   According to the National Kidney Foundation, irrespective of



   diagnosis, the stage of the disease is based on the level of



   kidney function:



   Stage Description                      GFR(mL/min/1.73 m(2))



   1     Kidney damage with normal or decreased GFR       90



   2     Kidney damage with mild decrease in GFR          60-89



   3     Moderate decrease in GFR                         30-59



   4     Severe decrease in GFR                           15-29



   5     Kidney failure                       <15 (or dialysis)

 

 19  Desirable <150



   Borderline high 150-199



   High 200-499



   Very High >500

 

 20  Desirable <200



   Borderline high 200-239



   High >239

 

 21  Low <40



   Desirable: 40-60



   High: >60

 

 22  Desirable: <100 mg/dL



   Near Optimal: 100-129 mg/dL



   Borderline High: 130-159 mg/dL



   High: 160-189 mg/dL



   Very High: >189 mg/dL

 

 23  *Ascorbic acid is present which may interfere with detection



   of blood.

 

 24  *******Because ethnic data is not always readily available,



   this report includes an eGFR for both -Americans and



   non- Americans.****



   The National Kidney Disease Education Program (NKDEP) does



   not endorse the use of the MDRD equation for patients that



   are not between the ages of 18 and 70, are pregnant, have



   extremes of body size, muscle mass, or nutritional status,



   or are non- or non-.



   According to the National Kidney Foundation, irrespective of



   diagnosis, the stage of the disease is based on the level of



   kidney function:



   Stage Description                      GFR(mL/min/1.73 m(2))



   1     Kidney damage with normal or decreased GFR       90



   2     Kidney damage with mild decrease in GFR          60-89



   3     Moderate decrease in GFR                         30-59



   4     Severe decrease in GFR                           15-29



   5     Kidney failure                       <15 (or dialysis)

 

 25  Hemolyzed, spoke to Yaima for recollect.

 

 26  Hemolyzed, spoke to Yaima for recollect.

 

 27  *******Because ethnic data is not always readily available,



   this report includes an eGFR for both -Americans and



   non- Americans.****



   The National Kidney Disease Education Program (NKDEP) does



   not endorse the use of the MDRD equation for patients that



   are not between the ages of 18 and 70, are pregnant, have



   extremes of body size, muscle mass, or nutritional status,



   or are non- or non-.



   According to the National Kidney Foundation, irrespective of



   diagnosis, the stage of the disease is based on the level of



   kidney function:



   Stage Description                      GFR(mL/min/1.73 m(2))



   1     Kidney damage with normal or decreased GFR       90



   2     Kidney damage with mild decrease in GFR          60-89



   3     Moderate decrease in GFR                         30-59



   4     Severe decrease in GFR                           15-29



   5     Kidney failure                       <15 (or dialysis)

 

 28  Low risk: <1.00



   Average risk: 1.00-3.00



   High risk: >3.00

 

 29  Serologic response to B. burgdorferi infection is not



   detected, but cannot rule out early infection during



   which low or undetectable antibody levels to



   B. burgdorferi may be present. If clinically indicated,



   a new serum specimen should be submitted in 7-14 days.



   Test Performed by:



   [facility]



   [address] [place] [address]

 

 30  Test Performed by:



   [facility] - [address] [address]

 

 31  -------------------REFERENCE VALUE--------------------------



   <=1.0 (Negative)

 

 32  -------------------REFERENCE VALUE--------------------------



   <20.0 (Negative)

 

 33  Tests for antibodies to dsDNA and JENNIFER antigens are not



   performed automatically unless the JANE result is > or=



   3.0 U.  Studies performed at [facility] indicate that



   positive JANE results <3.0 U are rarely accompanied by



   positive second order tests.



   Test Performed by:



   [facility] - [address] [address]

 

 34  *******Because ethnic data is not always readily available,



   this report includes an eGFR for both -Americans and



   non- Americans.****



   The National Kidney Disease Education Program (NKDEP) does



   not endorse the use of the MDRD equation for patients that



   are not between the ages of 18 and 70, are pregnant, have



   extremes of body size, muscle mass, or nutritional status,



   or are non- or non-.



   According to the National Kidney Foundation, irrespective of



   diagnosis, the stage of the disease is based on the level of



   kidney function:



   Stage Description                      GFR(mL/min/1.73 m(2))



   1     Kidney damage with normal or decreased GFR       90



   2     Kidney damage with mild decrease in GFR          60-89



   3     Moderate decrease in GFR                         30-59



   4     Severe decrease in GFR                           15-29



   5     Kidney failure                       <15 (or dialysis)

 

 35  Desirable <150



   Borderline high 150-199



   High 200-499



   Very High >500

 

 36  Desirable <200



   Borderline high 200-239



   High >239

 

 37  Low <40



   Desirable: 40-60



   High: >60

 

 38  Desirable: <100 mg/dL



   Near Optimal: 100-129 mg/dL



   Borderline High: 130-159 mg/dL



   High: 160-189 mg/dL



   Very High: >189 mg/dL

 

 39  *******Because ethnic data is not always readily available,



   this report includes an eGFR for both -Americans and



   non- Americans.****



   The National Kidney Disease Education Program (NKDEP) does



   not endorse the use of the MDRD equation for patients that



   are not between the ages of 18 and 70, are pregnant, have



   extremes of body size, muscle mass, or nutritional status,



   or are non- or non-.



   According to the National Kidney Foundation, irrespective of



   diagnosis, the stage of the disease is based on the level of



   kidney function:



   Stage Description                      GFR(mL/min/1.73 m(2))



   1     Kidney damage with normal or decreased GFR       90



   2     Kidney damage with mild decrease in GFR          60-89



   3     Moderate decrease in GFR                         30-59



   4     Severe decrease in GFR                           15-29



   5     Kidney failure                       <15 (or dialysis)

 

 40  *******Because ethnic data is not always readily available,



   this report includes an eGFR for both -Americans and



   non- Americans.****



   The National Kidney Disease Education Program (NKDEP) does



   not endorse the use of the MDRD equation for patients that



   are not between the ages of 18 and 70, are pregnant, have



   extremes of body size, muscle mass, or nutritional status,



   or are non- or non-.



   According to the National Kidney Foundation, irrespective of



   diagnosis, the stage of the disease is based on the level of



   kidney function:



   Stage Description                      GFR(mL/min/1.73 m(2))



   1     Kidney damage with normal or decreased GFR       90



   2     Kidney damage with mild decrease in GFR          60-89



   3     Moderate decrease in GFR                         30-59



   4     Severe decrease in GFR                           15-29



   5     Kidney failure                       <15 (or dialysis)

 

 41  Desirable <150



   Borderline high 150-199



   High 200-499



   Very High >500

 

 42  Desirable <200



   Borderline high 200-239



   High >239

 

 43  Low <40



   Desirable: 40-60



   High: >60

 

 44  Desirable: <100 mg/dL



   Near Optimal: 100-129 mg/dL



   Borderline High: 130-159 mg/dL



   High: 160-189 mg/dL



   Very High: >189 mg/dL

 

 45  Desirable <150



   Borderline high 150-199



   High 200-499



   Very High >500

 

 46  Desirable <200



   Borderline high 200-239



   High >239

 

 47  Low <40



   Desirable: 40-60



   High: >60

 

 48  Desirable: <100 mg/dL



   Near Optimal: 100-129 mg/dL



   Borderline High: 130-159 mg/dL



   High: 160-189 mg/dL



   Very High: >189 mg/dL

 

 49  *******Because ethnic data is not always readily available,



   this report includes an eGFR for both -Americans and



   non- Americans.****



   The National Kidney Disease Education Program (NKDEP) does



   not endorse the use of the MDRD equation for patients that



   are not between the ages of 18 and 70, are pregnant, have



   extremes of body size, muscle mass, or nutritional status,



   or are non- or non-.



   According to the National Kidney Foundation, irrespective of



   diagnosis, the stage of the disease is based on the level of



   kidney function:



   Stage Description                      GFR(mL/min/1.73 m(2))



   1     Kidney damage with normal or decreased GFR       90



   2     Kidney damage with mild decrease in GFR          60-89



   3     Moderate decrease in GFR                         30-59



   4     Severe decrease in GFR                           15-29



   5     Kidney failure                       <15 (or dialysis)

 

 50  Copy Result to: JIMBO RASHEED ([id number])

 

 51  *******Because ethnic data is not always readily available,



   this report includes an eGFR for both -Americans and



   non- Americans.****



   The National Kidney Disease Education Program (NKDEP) does



   not endorse the use of the MDRD equation for patients that



   are not between the ages of 18 and 70, are pregnant, have



   extremes of body size, muscle mass, or nutritional status,



   or are non- or non-.



   According to the National Kidney Foundation, irrespective of



   diagnosis, the stage of the disease is based on the level of



   kidney function:



   Stage Description                      GFR(mL/min/1.73 m(2))



   1     Kidney damage with normal or decreased GFR       90



   2     Kidney damage with mild decrease in GFR          60-89



   3     Moderate decrease in GFR                         30-59



   4     Severe decrease in GFR                           15-29



   5     Kidney failure                       <15 (or dialysis)

 

 52  Desirable <150



   Borderline high 150-199



   High 200-499



   Very High >500

 

 53  Desirable <200



   Borderline high 200-239



   High >239

 

 54  Low <40



   Desirable: 40-60



   High: >60

 

 55  Desirable: <100 mg/dL



   Near Optimal: 100-129 mg/dL



   Borderline High: 130-159 mg/dL



   High: 160-189 mg/dL



   Very High: >189 mg/dL

 

 56  Acute inflammation:  >10.00

 

 57  *******Because ethnic data is not always readily available,



   this report includes an eGFR for both -Americans and



   non- Americans.****



   The National Kidney Disease Education Program (NKDEP) does



   not endorse the use of the MDRD equation for patients that



   are not between the ages of 18 and 70, are pregnant, have



   extremes of body size, muscle mass, or nutritional status,



   or are non- or non-.



   According to the National Kidney Foundation, irrespective of



   diagnosis, the stage of the disease is based on the level of



   kidney function:



   Stage Description                      GFR(mL/min/1.73 m(2))



   1     Kidney damage with normal or decreased GFR       90



   2     Kidney damage with mild decrease in GFR          60-89



   3     Moderate decrease in GFR                         30-59



   4     Severe decrease in GFR                           15-29



   5     Kidney failure                       <15 (or dialysis)

 

 58  Please note: Effective 2015, the reference value



   for this test has changed due to the validation and



   activation of a new reagent lot number.

 

 59  *******Because ethnic data is not always readily available,



   this report includes an eGFR for both -Americans and



   non- Americans.****



   The National Kidney Disease Education Program (NKDEP) does



   not endorse the use of the MDRD equation for patients that



   are not between the ages of 18 and 70, are pregnant, have



   extremes of body size, muscle mass, or nutritional status,



   or are non- or non-.



   According to the National Kidney Foundation, irrespective of



   diagnosis, the stage of the disease is based on the level of



   kidney function:



   Stage Description                      GFR(mL/min/1.73 m(2))



   1     Kidney damage with normal or decreased GFR       90



   2     Kidney damage with mild decrease in GFR          60-89



   3     Moderate decrease in GFR                         30-59



   4     Severe decrease in GFR                           15-29



   5     Kidney failure                       <15 (or dialysis)

 

 60  Reference Range and Interpretation:



   TnI (ng/mL)             Interpretation



   Less Than 0.03 ng/mL    Not supportive of diagnosis of MI



   0.03 - 0.50 ng/mL       Indeterminate: suggest serial



   studies if clinically indicated.



   Greater than 0.5 ng/mL  Consistent with diagnosis of MI

 

 61  Acute inflammation:  >10.00

 

 62  Desirable <100



   Near Optimal 100-129



   Borderline high 130-159



   High 160-189



   Very High >189

 

 63  Desirable <150



   Borderline high 150-199



   High 200-499



   Very High >500

 

 64  Desirable <200



   Borderline high 200-239



   High >239

 

 65  Low <40



   Desirable: 40-60



   High: >60

 

 66  Desirable <100



   Near Optimal 100-129



   Borderline high 130-159



   High 160-189



   Very High >189

 

 67  **Please note:



   The following may produce a false positive D Dimer test:



   - Rheumatoid factor greater than 60 IU/ml



   - Plasma hemoglobin greater than 0.05 gm/dl



   - Bilirubin greater than 50 mg/dl



   - Lipids greater than 1000 mg/dl



   - FDP greater than 20 ug/ml

 

 68  *******Because ethnic data is not always readily available,



   this report includes an eGFR for both -Americans and



   non- Americans.****



   The National Kidney Disease Education Program (NKDEP) does



   not endorse the use of the MDRD equation for patients that



   are not between the ages of 18 and 70, are pregnant, have



   extremes of body size, muscle mass, or nutritional status,



   or are non- or non-.



   According to the National Kidney Foundation, irrespective of



   diagnosis, the stage of the disease is based on the level of



   kidney function:



   Stage Description                      GFR(mL/min/1.73 m(2))



   1     Kidney damage with normal or decreased GFR       90



   2     Kidney damage with mild decrease in GFR          60-89



   3     Moderate decrease in GFR                         30-59



   4     Severe decrease in GFR                           15-29



   5     Kidney failure                       <15 (or dialysis)

 

 69  CKMB interpretation should be made in conjunction with



   clinical symptoms, patient history and EKG changes.

 

 70  Reference Range and Interpretation:



   TnI (ng/mL)             Interpretation



   Less Than 0.06 ng/mL    Not supportive of diagnosis of MI



   0.06 - 0.50 ng/mL       Indeterminate: suggest serial



   studies if clinically indicated.



   Greater than 0.5 ng/mL  Consistent with diagnosis of MI

 

 71  HDL Interpretation:



   Undesirable: High Risk:  Less than 40 mg/dL



   Desirable:  Low Risk:  Greater than 60 mg/dL

 

 72  LDL Interpretation:



   Low Risk Optimal Level:  LDL Less than 100 mg/dL



   Near or Above Optimal:  -129 mg/dL



   Borderline High Risk:  -159 mg/dL



   High Risk:  -189 mg/dL



   Very High Risk:  LDL Greater than 189 mg/dL

 

 73  HDL Interpretation:



   Undesirable: High Risk:  Less than 40 MG/DL



   Desirable:  Low Risk:  Greater than 60 MG/DL

 

 74  LDL Interpretation:



   Low Risk Optimal Level:  LDL Less than 100 MG/DL



   Near or Above Optimal:  -129 MG/DL



   Borderline High Risk:  -159 MG/DL



   High Risk:  -189 MG/DL



   Very High Risk:  LDL Greater than 189 MG/DL

 

 75  *******Because ethnic data is not always readily available,



   this report includes an eGFR for both -Americans and



   non- Americans.****



   The National Kidney Disease Education Program (NKDEP) does



   not endorse the use of the MDRD equation for patients that



   are not between the ages of 18 and 70, are pregnant, have



   extremes of body size, muscle mass, or nutritional status,



   or are non- or non-.



   According to the National Kidney Foundation, irrespective of



   diagnosis, the stage of the disease is based on the level of



   kidney function:



   Stage Description                      GFR(mL/min/1.73 m(2))



   1     Kidney damage with normal or decreased GFR       90



   2     Kidney damage with mild decrease in GFR          60-89



   3     Moderate decrease in GFR                         30-59



   4     Severe decrease in GFR                           15-29



   5     Kidney failure                       <15 (or dialysis)

 

 76  Test Performed by:



   [place] [address]



   : Ulises Villa III, M.D.

 

 77  Serologic response to B. burgdorferi infection is not



   detected, but cannot rule out early infection during



   which low or undetectable antibody levels to



   B. burgdorferi may be present. If clinically indicated,



   a new serum specimen should be submitted in 7-14 days.



   Test Performed by:



   [address] [address]



   : Ulises Villa III, M.D.

 

 78  Lymphopenia %

 

 79  Anion gap measurement may be of limited value in the



   presence of any alkalosis, especially in a combined acid



   base disorder.



   .

 

 80  A metabolite of Naproxen, O-desmethylnaproxen, has been



   shown to interfere with the Jendrassik-Chris method for



   measuring total bilirubin.  Samples from patients who have



   taken Naproxen have shown spurious elevation in total



   bilirubin levels.

 

 81  *******Because ethnic data is not always readily available,



   this report includes an eGFR for both -Americans and



   non- Americans.****



   The National Kidney Disease Education Program (NKDEP) does



   not endorse the use of the MDRD equation for patients that



   are not between the ages of 18 and 70, are pregnant, have



   extremes of body size, muscle mass, or nutritional status,



   or are non- or non-.



   According to the National Kidney Foundation, irrespective of



   diagnosis, the stage of the disease is based on the level of



   kidney function:



   Stage Description                      GFR(mL/min/1.73 m(2))



   1     Kidney damage with normal or decreased GFR       90



   2     Kidney damage with mild decrease in GFR          60-89



   3     Moderate decrease in GFR                         30-59



   4     Severe decrease in GFR                           15-29



   5     Kidney failure                       <15 (or dialysis)

 

 82  CHOLESTEROL INTERPRETATION:



   Desirable:  Less than 200 MG/DL



   Borderline-High Risk:  200-239 MG/DL



   High-Risk:  240 MG/DL and over

 

 83  HDL INTERPRETATION:



   Undesirable: High Risk:  Less than 40 MG/DL



   Desirable:  Low Risk:  Greater than 60 MG/DL

 

 84  LDL INTERPRETATION:



   Low Risk Optimal Level:  LDL Less than 100 MG/DL



   Near or Above Optimal:  -129 MG/DL



   Borderline High Risk:  -159 MG/DL



   High Risk:  -189 MG/DL



   Very High Risk:  LDL Greater than 189 MG/DL







Procedures







 Date  CPT Code  Description  Status  Comment

 

 10/05/2018  19071  EKG Tracing & Interpretation  Completed  

 

 2018  89855  ECHO Transthoracic, Real-Time 2D With Doppler  Completed  



     And Color Flow    

 

 2018  96976  ECHO Transthoracic, Real-Time 2D With Doppler  Completed  



     And Color Flow    

 

 2018  66654  EKG Tracing & Interpretation  Completed  

 

 2018  27135  EKG Tracing & Interpretation  Completed  

 

 2018  06285  EKG Tracing & Interpretation  Completed  

 

 2018  04187  Stress Test  Completed  

 

 2018  17381  EKG Tracing & Interpretation  Completed  

 

 2018  47905  EKG Tracing & Interpretation  Completed  

 

 2018  69016  ECHO Transthoracic, Real-Time 2D With Doppler  Completed  



     And Color Flow    

 

 2017  15075  Plethysmography Determination Lung Volumes &  Completed  



     Per Airway Resist    

 

 2017  44971  Pulmonary Function><Bronchodil  Completed  

 

 2017  46807  ECHO Stress Test Incl Perf Contiuous ekg  Completed  



     Monitoring W/Phys Superv    

 

 2017  79345  Carotid Doppler,Bilateral  Completed  

 

 2017  96919  ECHO Transthoracic, Real-Time 2D With Doppler  Completed  



     And Color Flow    

 

 2017  88248  EKG Tracing & Interpretation  Completed  

 

 2015  44996  Stress Test Supervsn W/Out I/R  Completed  

 

 2015  11178  Treadmill Interp/Report Only  Completed  

 

 2015  93017  ECHO Transthoracic, Real-Time 2D With Doppler  Completed  



     And Color Flow    

 

 2015  01251  Stress ECHO Interpretation/Report Hospital  Completed  

 

 2015  91176  ECHO Transthoracic, Real-Time 2D With Doppler  Completed  



     And Color Flow    

 

 2015  00510  EKG Tracing & Interpretation  Completed  

 

 2015  62669  EKG, Interpretation Only  Completed  

 

 2014  03706  Polysomnography Sleep Staging 4+ Parameters  Completed  



     W/Cpap    

 

 2014  70799  EKG, Interpretation Only  Completed  

 

 2014  37413  IV Rx Trancath Therapy(Nitro/Kirk)  Completed  

 

 2014  26249  Percutaneous Transcatheter Placement Of  Completed  



     Intracoronary Stent    

 

 2014  33973  Revascularization Chronic Total Occlusion  Completed  



     Coronary Artery    

 

 2014  95574  Ptca W/Intracor Stent  Completed  

 

 2014  15638  Ptca W/Intracor Stent-Addtl Vessl  Completed  

 

 2014  17164  EKG, Interpretation Only  Completed  

 

 2014  87378  Echocardiogram, Limited Study  Completed  

 

 2014  19738  Cath PLMT&NJX L Ventriculog Img S&I  Completed  

 

 2014  07766  Left Heart Cath. Incl S/I Coronaries, Angio S/I  Completed  



     V Gram If Done    

 

 2013  52063  Rad Exam; Fingers  Completed  

 

 2013  49421  Rad Exam; Fingers  Completed  

 

 2013  77959  Inject Tendon Sheath Or Ligament Aponeurosis Eg  Completed  



     Plantar Fascia    

 

 2013  47238  Inject Tendon Sheath Or Ligament Aponeurosis Eg  Completed  



     Plantar Fascia    

 

 2007    Colonoscopy  Completed  normal







Encounters







 Type  Date  Location  Provider  CPT E/M  Dx

 

 Office Visit  2018  1:30p  [facility]  Saadia Greene M.D.  
85241  Z95.2



     [facility]      









 I35.0

 

 I48.0

 

 I10

 

 N20.9









 Office Visit  10/05/2018  9:00a  [facility]  Yu Tsai, 
N.P.  [id number]  I10









 Z95.2

 

 I48.0

 

 I35.0

 

 R31.9









 Office Visit  2018  2:40p  [facility]  Jimbo Rasheed,  
51526  R31.9



     - Lilly MENDOZA    

 

 Office Visit  2018  2:40p  [facility]  Saadia Greene M.D.  
35778  Z95.2



     [facility]      









 I35.0

 

 I48.0









 Office Visit  2018  9:00a  [facility]  Yu Tsai,  [id number]  
I35.0



     Cma  N.P.    









 I48.0

 

 Z95.2

 

 I10









 Office Visit  2018  2:30p  [facility]  Yu Tsai,  [id number]  
I35.0



     Cma  N.P.    









 I48.0

 

 Z95.2

 

 I10

 

 R60.0









 Office Visit  2018  4:20p  [facility]  Saadia Greene M.D.  
33642  I48.0



     [facility]      









 I35.0

 

 Z95.2

 

 I10









 Office Visit  2018  8:15a  [facility]  Nurse Visit IC  [id number]  
I10



     Cma      

 

 Office Visit  2018  8:00a  [facility] -  Jimbo Rasheed,  
23814  I48.0



     Lilly MENDOZA    









 I35.0

 

 I10









 Office Visit  2018 12:45p  [facility]  Saadia Greene M.D.  
86995  I35.0



     Tho      









 Z95.2

 

 I48.0

 

 R60.0









 Office Visit  2018 12:00p  [facility]  Yu Tsai,  [id number]  
I35.0



     Cma  N.P.    









 Z95.2

 

 I48.0

 

 R60.0

 

 I97.0









 Office Visit  2018  4:20p  [facility] Abundio Greene M.D.  
89569  I35.0



     [facility]      









 Z95.2

 

 I48.0

 

 R60.0

 

 R50.9

 

 G47.30









 Office Visit  2018  1:50p  [facility]   Saadia Greene M.D.  
32885  I35.0



     [facility]      









 I10

 

 E78.2

 

 I95.1









 Office Visit  2017  8:00a  [facility]  Jimbo Rasheed M.D.
  05009  I10



     - [place]      









 E78.2

 

 E66.9









 Office Visit  2017  8:00a  [facility]  Jimbo Rasheed M.D.
  19103  I10



     - [place]enton      









 E78.2

 

 E66.9









 Office Visit  2017  2:45p  [facility]  Saadia Greene M.D.  
25400  I10









 I35.0

 

 I25.10

 

 R06.02









 Office Visit  2017 11:20a  [facility]  Jimbo Rasheed  
51113  M13.0



     - Lilly MENDOZA    









 I10

 

 R06.02

 

 E78.2









 Office Visit  2017 11:00a  [facility]  Jimbo Rasheed  
67325  M13.0



     - Lilly MENDOZA    









 R06.02

 

 I10

 

 E78.2









 Office Visit  2017  8:00a  [facility]  Jimbo Rasheed  
01321  R06.02



     - Lilly MENDOZA    

 

 Office Visit  2017  2:15p  [facility] bAundio Greene M.D.  
49233  R06.02



     [facility]      









 I35.0

 

 I25.10

 

 I10









 Office Visit  2017  4:00p  [facility]  Jimbo Rasheed  [id number]  
J45.909



     [facility] [place] Leonardo MENDOZA    









 G47.33









 Office Visit  2017  8:30a  [facility]   Saadia Greene M.D.  
83543  I25.119



     [facility]      









 I10

 

 E78.2

 

 R06.02

 

 I35.0

 

 G47.33

 

 R09.89









 Office Visit  2017  7:40a  [facility]  Jimbo Rasheed M.D.
  10881  I10



     - [place]      









 E78.2

 

 E66.9

 

 M51.16

 

 M50.10

 

 L98.9

 

 G47.09

 

 Z00.00

 

 Z23









 Office Visit  2016  8:40a  [facility] Internal Medicine  Jimbo Rasheed M.D.
  [id number]  I10



     - Lilly      









 E78.2

 

 R73.01

 

 E66.9









 Office Visit  2016 10:30a  [facility]  Cristi Villaseñor,  [id number]  
M51.26



     Of [facility]  M.DSaloni    

 

 Office Visit  2016  2:00p  [facility]  Cristi Villaseñor,  [address]95  
M51.26



     Of [facility]  M.DSaloni    









 M43.17









 Office Visit  2016  8:20a  [facility] Internal  Jimbo Rasheed  [address]862  M51.16



     Medicine - Lilly MENDOZA    

 

 Office Visit  2016 11:20a  [facility]  Jimbo Rasheed  11006  M51.16



     Medicine - Lilly MENDOZA    

 

 Office Visit  2016  8:00a  [facility]  Jimbo Rasheed  86397  E78.2



     Medicine - Lilly MENDOZA    









 I10

 

 E66.9

 

 M51.36









 Office Visit  12/15/2015  9:00a  [facility]  Saadia Greene M.D.  
99924  I35.0



     [facility]      









 I25.10

 

 E78.2

 

 I10









 Office Visit  2015 11:30a  [facility]  Sadaia Greene M.D.  
25382  424.1



     [facility]      









 414.01

 

 780.8









 Office Visit  2015 11:10a  [facility] Internal Medicine  Emily Coleman  [address]802
  790.21



     - Lilly MENDOZA    









 560.9

 

 272.2

 

 719.46

 

 564.09

 

 278.00









 Office Visit  2015  8:30a  [facility]  Saadia Greene M.D.  
61420  424.1



     [facility]      









 412

 

 414.01

 

 401.1









 Office Visit  2015 10:49a  [facility],JOSY Calle  
[address]  560.9



     Hospitalists      









 412









 Office Visit  2015 10:48a  [facility],JOSY Calle  
11800  560.9



     Hospitalists      









 412









 Office Visit  2014 11:30a  [facility] Internal Medicine  Emily Coleman  21495
  555.1



     - Lilly MENDOZA    

 

 Office Visit  10/01/2014 10:50a  [facility] Medicine  Emily Coleman  [address]85
  278.00



     - Lilly MENDOZA    









 722.93

 

 272.2

 

 401.1

 

 V04.81









 Office Visit  2014  8:30a  [facility]  Nurse Visit IC  [id number]  
401.9



     [facility]      

 

 Office Visit  2014  4:08p  [facility]  Wm FAJARDO [place],  [address]
  780.57



       MANU    









 786.09

 

 401.9









 Office Visit  2014 10:15a  [facility]  Saadia Greene M.D.  
52361  414.01



     [facility]      









 401.1

 

 272.2

 

 424.1









 Office Visit  2014  2:20p  [facility]  Radha Burnett D.O.  
09351  410.90









 411.1

 

 414.01

 

 401.1

 

 272.2

 

 424.1









 Office Visit  2014 10:26a  [facility]  Radha Burnett  31521  
411.1



       D.O.    

 

 Office Visit  2014 10:26a  [facility]  Radha Burnett  95368  
411.1



       D.O.    

 

 Office Visit  2013  2:30p  [facility] Medicine  Emily Coleman M.D.  
55529  401.9



     - [place]      

 

 Office Visit  2013 10:45a  [facility]  Radha Maldonado  89342  
719.44



     Of [facility] AT [place]  M.D.    









 727.03









 Office Visit  2013 10:40a  [facility] Internal Medicine  Jimbo Rasheed  
47739  401.1



     - Lilly MENDOZA    









 272.2

 

 790.6

 

 722.93

 

 719.46

 

 V76.44









 Office Visit  2012 12:40p  [facility]  Jimbo Rasheed  46716  722.93



     Medicine - Lilly MENDOZA    









 272.2









 Office Visit  2012  8:40a  [facility]  Jimbo Rasheed  
93602  401.1



     - Lilly MENDOZA    









 272.2

 

 790.6

 

 727.03

 

 715.00









 Office Visit  10/01/2012  3:00p  [facility] Medicine  Luis Fernando Cisse  
16191  709.9



     - Lilly MENDOZA    

 

 Office Visit  2012  9:20a  [facility] Medicine  Jimbo Rasheed,  
01520  401.1



     - Lilly MENDOZA    









 477.9

 

 424.0

 

 278.00









 Office Visit  2011  9:30a  DO Not Use [facility] AT  Emily Coleman M.D.  
20053  278.00



     [facility]      









 401.1

 

 722.93

 

 477.9

 

 691.8









 Office Visit  2011  8:00a  DO Not Use [facility] AT  Jimbo Rasheed M.D.  
63287  401.1



     [facility]      









 477.9

 

 722.93

 

 V72.62

 

 278.00

 

 709.9







Plan of Care

Future Appointment(s):2018  8:00 am - Jimbo Rasheed M.D. at [facility] 
Internal Medicine - Npbdnxhvr07/12/2018 - Saadia Greene M.D.Z95.2 Presence of 
prosthetic heart wybyeX39.0 Nonrheumatic aortic (valve) xruyroluZ50.0 
Paroxysmal atrial fibrillationComments:In sinus rhythm by pulse.Follow up:3-4 
months with ECG.Recommendations:Continue amiodarone for now, if you continue to 
need to come off coumodin we may need to stop amiodarone and then try another 
agent.I10 Essential (primary) hypertensionNew Medication:Spironolactone 25 
mgComments:Continue Valsartan/HCTZ, Carvedilol.Add Sprionolactone 25 mg/daySTOP 
POTASSIUMFollow up:Mail or call in home BP's in 2-5 weeks  Get Labs in 1 
month.N20.9 Urinary calculus, unspecified

## 2018-12-21 ENCOUNTER — HOSPITAL ENCOUNTER (EMERGENCY)
Dept: HOSPITAL 25 - ED | Age: 58
Discharge: HOME | End: 2018-12-21
Payer: COMMERCIAL

## 2018-12-21 VITALS — DIASTOLIC BLOOD PRESSURE: 79 MMHG | SYSTOLIC BLOOD PRESSURE: 136 MMHG

## 2018-12-21 DIAGNOSIS — R42: ICD-10-CM

## 2018-12-21 DIAGNOSIS — R51: ICD-10-CM

## 2018-12-21 DIAGNOSIS — Z95.2: ICD-10-CM
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
## 2018-12-21 DIAGNOSIS — Z79.01: ICD-10-CM
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
## 2018-12-21 DIAGNOSIS — I48.91: ICD-10-CM

## 2018-12-21 DIAGNOSIS — Z87.891: ICD-10-CM

## 2018-12-21 DIAGNOSIS — S60.222A: Primary | ICD-10-CM

## 2018-12-21 LAB
ALBUMIN SERPL BCG-MCNC: 4.7 G/DL (ref 3.2–5.2)
ALBUMIN/GLOB SERPL: 2 {RATIO} (ref 1–3)
ALP SERPL-CCNC: 49 U/L (ref 34–104)
ALT SERPL W P-5'-P-CCNC: 28 U/L (ref 7–52)
ANION GAP SERPL CALC-SCNC: 7 MMOL/L (ref 2–11)
APTT PPP: 54.6 SECONDS (ref 26–36.3)
AST SERPL-CCNC: 23 U/L (ref 13–39)
BASOPHILS # BLD AUTO: 0 10^3/UL (ref 0–0.2)
BUN SERPL-MCNC: 31 MG/DL (ref 6–24)
BUN/CREAT SERPL: 26.7 (ref 8–20)
CALCIUM SERPL-MCNC: 9.5 MG/DL (ref 8.6–10.3)
CHLORIDE SERPL-SCNC: 105 MMOL/L (ref 101–111)
EOSINOPHIL # BLD AUTO: 0 10^3/UL (ref 0–0.6)
GLOBULIN SER CALC-MCNC: 2.4 G/DL (ref 2–4)
GLUCOSE SERPL-MCNC: 134 MG/DL (ref 70–100)
HCO3 SERPL-SCNC: 24 MMOL/L (ref 22–32)
HCT VFR BLD AUTO: 46 % (ref 42–52)
HGB BLD-MCNC: 15.5 G/DL (ref 14–18)
INR PPP/BLD: 3.29 (ref 0.77–1.02)
LYMPHOCYTES # BLD AUTO: 1.3 10^3/UL (ref 1–4.8)
MCH RBC QN AUTO: 34 PG (ref 27–31)
MCHC RBC AUTO-ENTMCNC: 34 G/DL (ref 31–36)
MCV RBC AUTO: 101 FL (ref 80–94)
MONOCYTES # BLD AUTO: 0.7 10^3/UL (ref 0–0.8)
NEUTROPHILS # BLD AUTO: 2.5 10^3/UL (ref 1.5–7.7)
NRBC # BLD AUTO: 0 10^3/UL
NRBC BLD QL AUTO: 0
PLATELET # BLD AUTO: 208 10^3/UL (ref 150–450)
POTASSIUM SERPL-SCNC: 4 MMOL/L (ref 3.5–5)
PROT SERPL-MCNC: 7.1 G/DL (ref 6.4–8.9)
RBC # BLD AUTO: 4.55 10^6/UL (ref 4–5.4)
SODIUM SERPL-SCNC: 136 MMOL/L (ref 135–145)
WBC # BLD AUTO: 4.6 10^3/UL (ref 3.5–10.8)

## 2018-12-21 PROCEDURE — 96361 HYDRATE IV INFUSION ADD-ON: CPT

## 2018-12-21 PROCEDURE — 99283 EMERGENCY DEPT VISIT LOW MDM: CPT

## 2018-12-21 PROCEDURE — 36415 COLL VENOUS BLD VENIPUNCTURE: CPT

## 2018-12-21 PROCEDURE — 93005 ELECTROCARDIOGRAM TRACING: CPT

## 2018-12-21 PROCEDURE — 83605 ASSAY OF LACTIC ACID: CPT

## 2018-12-21 PROCEDURE — 84484 ASSAY OF TROPONIN QUANT: CPT

## 2018-12-21 PROCEDURE — 85025 COMPLETE CBC W/AUTO DIFF WBC: CPT

## 2018-12-21 PROCEDURE — 85610 PROTHROMBIN TIME: CPT

## 2018-12-21 PROCEDURE — 85730 THROMBOPLASTIN TIME PARTIAL: CPT

## 2018-12-21 PROCEDURE — 80053 COMPREHEN METABOLIC PANEL: CPT

## 2018-12-21 PROCEDURE — 96374 THER/PROPH/DIAG INJ IV PUSH: CPT

## 2018-12-21 PROCEDURE — 86140 C-REACTIVE PROTEIN: CPT

## 2018-12-21 PROCEDURE — 70450 CT HEAD/BRAIN W/O DYE: CPT

## 2018-12-21 RX ADMIN — MORPHINE SULFATE ONE: 4 INJECTION INTRAVENOUS at 19:18

## 2018-12-21 RX ADMIN — SODIUM CHLORIDE ONE MLS/HR: 900 IRRIGANT IRRIGATION at 19:23

## 2018-12-21 RX ADMIN — ACETAMINOPHEN ONE MG: 325 TABLET ORAL at 19:23

## 2021-11-26 NOTE — ED
Dizziness





- HPI Summary


HPI Summary: 


58-year-old male presents with bruising of left hand since yesterday.  He 

states the bruising has been increasing.  He denies any new injury.  He was 

working with wood a couple days ago.  He is right-handed.  He also states he's 

been having headache for the past 2 weeks.  Is not worst headache of his life.  

Headache is located top of his head. he had a negative CT a week ago when he 

was having this headache.  Denies any sinus congestion.  Denies any vision 

changes.  He admits to dizziness when he stands up.  dizziness only occurs 

immediately after standing.  he has not passed out. He states he's been having 

fluctuating blood pressure.  blood pressure was elevated so they increase his 

blood pressure medication and then it dropped too low so they had to her reduce 

it again.  This is not the worst headache of his life.  He denies any fevers.  

No neck pain.  No chest pain or shortness of breath.  No bowel pain nausea 

vomiting.  No fevers.  He states he has pain over where the bruising is.  He 

states he had INR checked today is 2.2.  He has a history of valvular replace 

is on Coumadin for such and he also has history of A. fib.  He is on amiodarone 

for A. fib.








- History Of Current Complaint


Chief Complaint: EDDizziness


Stated Complaint: LOW BP AND BRUISING IN LEFT HAND


Time Seen by Provider: 12/21/18 17:42





- Allergies/Home Medications


Allergies/Adverse Reactions: 


 Allergies











Allergy/AdvReac Type Severity Reaction Status Date / Time


 


amlodipine [From Norvasc] Allergy  Edema Verified 11/09/18 10:59


 


colchicine Allergy  Diarrhea Verified 11/09/18 10:59














PMH/Surg Hx/FS Hx/Imm Hx


Endocrine/Hematology History: 


   Denies: Hx Diabetes


Cardiovascular History: Reports: Hx Angina, Hx Atrial Fibrillation, Hx Coronary 

Artery Disease, Hx Hypercholesterolemia, Hx Hypertension, Hx Valvular Heart 

Disease - AORTIC VALVE REPLACED, Other Cardiovascular Problems/Disorders - SEES 

DR. COLÓN


   Denies: Hx Congestive Heart Failure, Hx Pacemaker/ICD


Respiratory History: Reports: Hx Asthma - poss recent diagnosis, Hx Seasonal 

Allergies - "constant", Hx Sleep Apnea, Other Respiratory Problems/Disorders - 

ex-smoker


GI History: Reports: Hx Obstructive Bowel, Other GI Disorders - "BLOCKED 

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


   Denies: Hx Ulcer


 History: Reports: Hx Kidney Stones


   Denies: Hx Acute Renal Failure, Hx Renal Disease


Musculoskeletal History: Reports: Hx Arthritis - knees, back, hands, Hx 

Rheumatoid Arthritis, Hx Back Problems - fractures, degenerated disks, bone 

spurs, Hx Tendonitis - elbow, Other Musculoskeletal History - DDD


   Denies: Hx Scoliosis


Sensory History: Reports: Hx Contacts or Glasses


   Denies: Hx Cataracts, Hx Hearing Aid


Opthamlomology History: Reports: Hx Contacts or Glasses


   Denies: Hx Cataracts


Neurological History: Reports: Other Neuro Impairments/Disorders - PAIN CLINIC 

PT


   Denies: Hx Developmental Delay, Hx Headaches, Hx Migraine, Hx Nerve Disease, 

Hx Seizures, Hx Spinal Cord Injury, Hx Transient Ischemic Attacks (TIA)


Psychiatric History: 


   Denies: Hx Panic Disorder





- Surgical History


Surgery Procedure, Year, and Place: RIGHT KNEE ARTHROSCOPES X 3; bowel surgery 

endometrial reattachment; T&A; CARDIAC STENTS X 2- PATIENT WILL BRING CARDS TO 

BE SCANNED IN (PER CARDIAC CATH REPORT A PROMUS PREMIER STENT AND A PROMUS 

ELEMENT STENT WERE PLACED)


Hx Anesthesia Reactions: No


Infectious Disease History: No


Infectious Disease History: 


   Denies: Hx Clostridium Difficile, Hx Hepatitis, Hx Human Immunodeficiency 

Virus (HIV), Hx of Known/Suspected MRSA, Hx Shingles, Hx Tuberculosis, Hx Known/

Suspected VRE, Hx Known/Suspected VRSA, History Other Infectious Disease, 

Traveled Outside the [place] in Last 30 Days





- Family History


Known Family History: Positive: Cardiac Disease





- Social History


Alcohol Use: Weekly


Alcohol Amount: 2-3 DRINKS


Hx Substance Use: No


Substance Use Type: Reports: None


Hx Tobacco Use: Yes


Smoking Status (MU): Never Smoked Tobacco


Type: Cigarettes


Amount Used/How Often: 2 PPD X 13 YEARS


Length of Time of Smoking/Using Tobacco: 15 years


Have You Smoked in the Last Year: No





Review of Systems


Negative: Fever


Negative: Chest Pain


Negative: Shortness Of Breath


Positive: Bruising


Neurological: Other - dizziness


Positive: Headache


All Other Systems Reviewed And Are Negative: Yes





Physical Exam


Triage Information Reviewed: Yes


Vital Signs On Initial Exam: 


 Initial Vitals











Temp Pulse Resp BP Pulse Ox


 


 97.7 F   78   20   147/88   96 


 


 12/21/18 13:21  12/21/18 13:21  12/21/18 13:21  12/21/18 13:21  12/21/18 13:21











Vital Signs Reviewed: Yes


Appearance: Positive: Well-Appearing


Skin: Positive: Warm, Dry


Head/Face: Positive: Normal Head/Face Inspection


Eyes: Positive: Normal, EOMI, NATHANIEL, Conjunctiva Clear


ENT: Positive: Normal ENT inspection, Pharynx normal, TMs normal


Respiratory/Lung Sounds: Positive: Clear to Auscultation, Breath Sounds Present


Cardiovascular: Positive: Normal, RRR


Abdomen Description: Positive: Nontender, Soft


Bowel Sounds: Positive: Present


Musculoskeletal: Positive: Strength/ROM Intact - left hand, Other - brusing to 

left 3-4th digit, capillary refill<2secs


Neurological: Positive: Sensory/Motor Intact, Alert, Oriented to Person Place, 

Time, CN Intact II-III


Psychiatric: Positive: Normal





- Kina Coma Scale


Best Eye Response: 4 - Spontaneous


Best Motor Response: 6 - Obeys Commands


Best Verbal Response: 5 - Oriented


Coma Scale Total: 15





Diagnostics





- Vital Signs


 Vital Signs











  Temp Pulse Resp BP Pulse Ox


 


 12/21/18 18:07   71   152/92 


 


 12/21/18 17:05  97.7 F  67  18  141/92  95


 


 12/21/18 15:00  98 F  85  18  133/75  98


 


 12/21/18 13:21  97.7 F  78  20  147/88  96














- Laboratory


Lab Results: 


 Lab Results











  12/21/18 12/21/18 12/21/18 Range/Units





  17:43 17:43 17:43 


 


WBC  4.6    (3.5-10.8)  10^3/ul


 


RBC  4.55    (4.00-5.40)  10^6/ul


 


Hgb  15.5    (14.0-18.0)  g/dl


 


Hct  46    (42-52)  %


 


MCV  101 H    (80-94)  fL


 


MCH  34 H    (27-31)  pg


 


MCHC  34    (31-36)  g/dl


 


RDW  16 H    (10.5-15)  %


 


Plt Count  208    (150-450)  10^3/ul


 


MPV  7.9    (7.4-10.4)  fL


 


Neut % (Auto)  54.8    %


 


Lymph % (Auto)  27.5    %


 


Mono % (Auto)  15.9    %


 


Eos % (Auto)  0.9    %


 


Baso % (Auto)  0.9    %


 


Absolute Neuts (auto)  2.5    (1.5-7.7)  10^3/ul


 


Absolute Lymphs (auto)  1.3    (1.0-4.8)  10^3/ul


 


Absolute Monos (auto)  0.7    (0-0.8)  10^3/ul


 


Absolute Eos (auto)  0    (0-0.6)  10^3/ul


 


Absolute Basos (auto)  0    (0-0.2)  10^3/ul


 


Absolute Nucleated RBC  0    10^3/ul


 


Nucleated RBC %  0    


 


INR (Anticoag Therapy)     (0.77-1.02)  


 


APTT     (26.0-36.3)  seconds


 


Sodium   136   (135-145)  mmol/L


 


Potassium   4.0   (3.5-5.0)  mmol/L


 


Chloride   105   (101-111)  mmol/L


 


Carbon Dioxide   24   (22-32)  mmol/L


 


Anion Gap   7   (2-11)  mmol/L


 


BUN   31 H   (6-24)  mg/dL


 


Creatinine   1.16   (0.67-1.17)  mg/dL


 


Est GFR ( Amer)   78.0   (>60)  


 


Est GFR (Non-Af Amer)   64.4   (>60)  


 


BUN/Creatinine Ratio   26.7 H   (8-20)  


 


Glucose   134 H   ()  mg/dL


 


Lactic Acid    1.0  (0.5-2.0)  mmol/L


 


Calcium   9.5   (8.6-10.3)  mg/dL


 


Total Bilirubin   0.40   (0.2-1.0)  mg/dL


 


AST   23   (13-39)  U/L


 


ALT   28   (7-52)  U/L


 


Alkaline Phosphatase   49   ()  U/L


 


Troponin I   0.00   (<0.04)  ng/mL


 


C-Reactive Protein   3.22   (<8.01)  mg/L


 


Total Protein   7.1   (6.4-8.9)  g/dL


 


Albumin   4.7   (3.2-5.2)  g/dL


 


Globulin   2.4   (2-4)  g/dL


 


Albumin/Globulin Ratio   2.0   (1-3)  














  12/21/18 Range/Units





  17:43 


 


WBC   (3.5-10.8)  10^3/ul


 


RBC   (4.00-5.40)  10^6/ul


 


Hgb   (14.0-18.0)  g/dl


 


Hct   (42-52)  %


 


MCV   (80-94)  fL


 


MCH   (27-31)  pg


 


MCHC   (31-36)  g/dl


 


RDW   (10.5-15)  %


 


Plt Count   (150-450)  10^3/ul


 


MPV   (7.4-10.4)  fL


 


Neut % (Auto)   %


 


Lymph % (Auto)   %


 


Mono % (Auto)   %


 


Eos % (Auto)   %


 


Baso % (Auto)   %


 


Absolute Neuts (auto)   (1.5-7.7)  10^3/ul


 


Absolute Lymphs (auto)   (1.0-4.8)  10^3/ul


 


Absolute Monos (auto)   (0-0.8)  10^3/ul


 


Absolute Eos (auto)   (0-0.6)  10^3/ul


 


Absolute Basos (auto)   (0-0.2)  10^3/ul


 


Absolute Nucleated RBC   10^3/ul


 


Nucleated RBC %   


 


INR (Anticoag Therapy)  3.29 H  (0.77-1.02)  


 


APTT  54.6 H  (26.0-36.3)  seconds


 


Sodium   (135-145)  mmol/L


 


Potassium   (3.5-5.0)  mmol/L


 


Chloride   (101-111)  mmol/L


 


Carbon Dioxide   (22-32)  mmol/L


 


Anion Gap   (2-11)  mmol/L


 


BUN   (6-24)  mg/dL


 


Creatinine   (0.67-1.17)  mg/dL


 


Est GFR ( Amer)   (>60)  


 


Est GFR (Non-Af Amer)   (>60)  


 


BUN/Creatinine Ratio   (8-20)  


 


Glucose   ()  mg/dL


 


Lactic Acid   (0.5-2.0)  mmol/L


 


Calcium   (8.6-10.3)  mg/dL


 


Total Bilirubin   (0.2-1.0)  mg/dL


 


AST   (13-39)  U/L


 


ALT   (7-52)  U/L


 


Alkaline Phosphatase   ()  U/L


 


Troponin I   (<0.04)  ng/mL


 


C-Reactive Protein   (<8.01)  mg/L


 


Total Protein   (6.4-8.9)  g/dL


 


Albumin   (3.2-5.2)  g/dL


 


Globulin   (2-4)  g/dL


 


Albumin/Globulin Ratio   (1-3)  











Result Diagrams: 


 12/21/18 17:43





 12/21/18 17:43


Lab Statement: Any lab studies that have been ordered have been reviewed, and 

results considered in the medical decision making process.





- CT


  ** brain


CT Interpretation Completed By: Radiologist


Summary of CT Findings: IMPRESSION:  No acute intracranial abnormality.





- EKG


  ** No standard instances


Cardiac Rate: NL


EKG Rhythm: Sinus Rhythm


Summary of EKG Findings: sinus rhythm, old inferior infaract





Re-Evaluation





- Re-Evaluation


  ** First Eval


Re-Evaluation Time: 18:43


Comment: appears comfortable in room





  ** Second Eval


Re-Evaluation Time: 20:47


Change: Improved


Comment: headache improved with tyenlol





Dizzy Course/Dx





- Course


Course Of Treatment: 58-year-old male presents with headache for the past 2 

weeks.  He states he's been having fluctuating blood pressure.  He states that 

he feels dizzy when he stands up.  No chest pain or shortness of breath.  He is 

on coumadin for a valvular replacement.  noticed some bruising to his left hand 

that has increased.  No injury.  Has pain at the site.  Full range of motion.  

On exam lungs clear to auscultation.  Normal neuro exam.  Has bruising noted to 

left hand.  No evidence of cellulitis.  INR is 3.2.  Platelets are normal.  

Discussed bruising likely due from INR being elevated but is a self-contained 

areas to does not need any intervention at this time.  did have a negative CT a 

week ago but headache has persisted and with elevated inr will get another CT.  

Vitals are not orthostatic. CT brain normal. headache improved with tyenlol. 

discussed case with dr so. patient says recently restart spirolactone so 

likely causing dizziness with standing. told to follow up with primary. patient 

understand and agrees with plan.





- Diagnoses


Differential Diagnosis/HQI/PQRI: Benign Paroxysmal Positional Vertigo, 

Hypovolemia, Metabolic Abnormality


Provider Diagnoses: 


 Bruising, Dizziness, Headache








Discharge





- Sign-Out/Discharge


Documenting (check all that apply): Patient Departure





- Discharge Plan


Condition: Good


Disposition: HOME


Patient Education Materials:  Ecchymosis (ED)


Referrals: 


Jimbo Rasheed MD [Primary Care Provider] - 


Additional Instructions: 


drink plenty of fluids


Stand up slowly


place ice on hand, elevate


Take tyenlol every 6 hours for pain


Follow up with primary within 5 days


Return to ED if develop any new or worsening symptoms





- Billing Disposition and Condition


Condition: GOOD


Disposition: Home
Spine appears normal, range of motion is not limited, no muscle or joint tenderness